# Patient Record
Sex: MALE | NOT HISPANIC OR LATINO | Employment: UNEMPLOYED | URBAN - METROPOLITAN AREA
[De-identification: names, ages, dates, MRNs, and addresses within clinical notes are randomized per-mention and may not be internally consistent; named-entity substitution may affect disease eponyms.]

---

## 2022-01-13 ENCOUNTER — TELEPHONE (OUTPATIENT)
Dept: BEHAVIORAL HEALTH | Facility: CLINIC | Age: 27
End: 2022-01-13

## 2022-01-13 ENCOUNTER — HOSPITAL ENCOUNTER (OUTPATIENT)
Facility: CLINIC | Age: 27
Setting detail: OBSERVATION
Discharge: PSYCHIATRIC OR SUBSTANCE USE FACILITY | End: 2022-01-14
Attending: EMERGENCY MEDICINE | Admitting: PSYCHIATRY & NEUROLOGY

## 2022-01-13 DIAGNOSIS — Z91.148 NONCOMPLIANCE WITH MEDICATION REGIMEN: ICD-10-CM

## 2022-01-13 DIAGNOSIS — Z86.59 HISTORY OF PSYCHIATRIC DISORDER: ICD-10-CM

## 2022-01-13 DIAGNOSIS — T65.92XA INGESTION OF SUBSTANCE, INTENTIONAL SELF-HARM, INITIAL ENCOUNTER (H): ICD-10-CM

## 2022-01-13 DIAGNOSIS — R45.851 SUICIDAL IDEATION: ICD-10-CM

## 2022-01-13 LAB
AMPHETAMINES UR QL SCN: NORMAL
ANION GAP SERPL CALCULATED.3IONS-SCNC: 8 MMOL/L (ref 3–14)
APAP SERPL-MCNC: <2 MG/L (ref 10–30)
ATRIAL RATE - MUSE: 92 BPM
BARBITURATES UR QL: NORMAL
BENZODIAZ UR QL: NORMAL
BUN SERPL-MCNC: 6 MG/DL (ref 7–30)
CALCIUM SERPL-MCNC: 8.5 MG/DL (ref 8.5–10.1)
CANNABINOIDS UR QL SCN: NORMAL
CHLORIDE BLD-SCNC: 112 MMOL/L (ref 94–109)
CO2 SERPL-SCNC: 22 MMOL/L (ref 20–32)
COCAINE UR QL: NORMAL
CREAT SERPL-MCNC: 0.53 MG/DL (ref 0.66–1.25)
DIASTOLIC BLOOD PRESSURE - MUSE: NORMAL MMHG
ETHANOL SERPL-MCNC: 0.27 G/DL
GFR SERPL CREATININE-BSD FRML MDRD: >90 ML/MIN/1.73M2
GLUCOSE BLD-MCNC: 120 MG/DL (ref 70–99)
INTERPRETATION ECG - MUSE: NORMAL
OPIATES UR QL SCN: NORMAL
P AXIS - MUSE: 17 DEGREES
PCP UR QL SCN: NORMAL
POTASSIUM BLD-SCNC: 3.3 MMOL/L (ref 3.4–5.3)
PR INTERVAL - MUSE: 152 MS
QRS DURATION - MUSE: 102 MS
QT - MUSE: 370 MS
QTC - MUSE: 457 MS
R AXIS - MUSE: 77 DEGREES
SALICYLATES SERPL-MCNC: <2 MG/DL
SARS-COV-2 RNA RESP QL NAA+PROBE: NEGATIVE
SODIUM SERPL-SCNC: 142 MMOL/L (ref 133–144)
SYSTOLIC BLOOD PRESSURE - MUSE: NORMAL MMHG
T AXIS - MUSE: 36 DEGREES
VENTRICULAR RATE- MUSE: 92 BPM

## 2022-01-13 PROCEDURE — 250N000011 HC RX IP 250 OP 636: Performed by: PSYCHIATRY & NEUROLOGY

## 2022-01-13 PROCEDURE — 80307 DRUG TEST PRSMV CHEM ANLYZR: CPT | Performed by: EMERGENCY MEDICINE

## 2022-01-13 PROCEDURE — 87635 SARS-COV-2 COVID-19 AMP PRB: CPT | Performed by: EMERGENCY MEDICINE

## 2022-01-13 PROCEDURE — 36415 COLL VENOUS BLD VENIPUNCTURE: CPT | Performed by: EMERGENCY MEDICINE

## 2022-01-13 PROCEDURE — 80143 DRUG ASSAY ACETAMINOPHEN: CPT | Performed by: EMERGENCY MEDICINE

## 2022-01-13 PROCEDURE — 93005 ELECTROCARDIOGRAM TRACING: CPT

## 2022-01-13 PROCEDURE — 90791 PSYCH DIAGNOSTIC EVALUATION: CPT

## 2022-01-13 PROCEDURE — 250N000013 HC RX MED GY IP 250 OP 250 PS 637: Performed by: PSYCHIATRY & NEUROLOGY

## 2022-01-13 PROCEDURE — 80179 DRUG ASSAY SALICYLATE: CPT | Performed by: EMERGENCY MEDICINE

## 2022-01-13 PROCEDURE — G0378 HOSPITAL OBSERVATION PER HR: HCPCS

## 2022-01-13 PROCEDURE — 82310 ASSAY OF CALCIUM: CPT | Performed by: EMERGENCY MEDICINE

## 2022-01-13 PROCEDURE — 250N000013 HC RX MED GY IP 250 OP 250 PS 637: Performed by: EMERGENCY MEDICINE

## 2022-01-13 PROCEDURE — 82077 ASSAY SPEC XCP UR&BREATH IA: CPT | Performed by: EMERGENCY MEDICINE

## 2022-01-13 PROCEDURE — 99285 EMERGENCY DEPT VISIT HI MDM: CPT

## 2022-01-13 PROCEDURE — C9803 HOPD COVID-19 SPEC COLLECT: HCPCS

## 2022-01-13 RX ORDER — DIVALPROEX SODIUM 500 MG/1
500 TABLET, EXTENDED RELEASE ORAL AT BEDTIME
Status: ON HOLD | COMMUNITY
Start: 2021-12-31 | End: 2022-01-21

## 2022-01-13 RX ORDER — DIAZEPAM 10 MG/2ML
5-10 INJECTION, SOLUTION INTRAMUSCULAR; INTRAVENOUS EVERY 30 MIN PRN
Status: DISCONTINUED | OUTPATIENT
Start: 2022-01-13 | End: 2022-01-14 | Stop reason: HOSPADM

## 2022-01-13 RX ORDER — QUETIAPINE FUMARATE 50 MG/1
150 TABLET, FILM COATED ORAL AT BEDTIME
Status: ON HOLD | COMMUNITY
Start: 2021-12-31 | End: 2022-01-21

## 2022-01-13 RX ORDER — QUETIAPINE FUMARATE 100 MG/1
100 TABLET, FILM COATED ORAL AT BEDTIME
Status: DISCONTINUED | OUTPATIENT
Start: 2022-01-13 | End: 2022-01-14 | Stop reason: HOSPADM

## 2022-01-13 RX ORDER — DOCUSATE SODIUM 100 MG/1
100 CAPSULE, LIQUID FILLED ORAL DAILY PRN
Status: ON HOLD | COMMUNITY
End: 2022-01-21

## 2022-01-13 RX ORDER — ACETAMINOPHEN 325 MG/1
650 TABLET ORAL ONCE
Status: COMPLETED | OUTPATIENT
Start: 2022-01-13 | End: 2022-01-13

## 2022-01-13 RX ORDER — ONDANSETRON 4 MG/1
4 TABLET, ORALLY DISINTEGRATING ORAL EVERY 6 HOURS PRN
Status: DISCONTINUED | OUTPATIENT
Start: 2022-01-13 | End: 2022-01-14 | Stop reason: HOSPADM

## 2022-01-13 RX ORDER — TRAZODONE HYDROCHLORIDE 100 MG/1
100 TABLET ORAL
Status: ON HOLD | COMMUNITY
Start: 2021-12-31 | End: 2022-01-21

## 2022-01-13 RX ORDER — POTASSIUM CHLORIDE 1.5 G/1.58G
40 POWDER, FOR SOLUTION ORAL ONCE
Status: COMPLETED | OUTPATIENT
Start: 2022-01-13 | End: 2022-01-13

## 2022-01-13 RX ORDER — HYDROXYZINE HYDROCHLORIDE 50 MG/1
50 TABLET, FILM COATED ORAL EVERY 6 HOURS PRN
Status: ON HOLD | COMMUNITY
Start: 2021-12-31 | End: 2022-01-21

## 2022-01-13 RX ORDER — DIAZEPAM 5 MG
10 TABLET ORAL EVERY 30 MIN PRN
Status: DISCONTINUED | OUTPATIENT
Start: 2022-01-13 | End: 2022-01-14 | Stop reason: HOSPADM

## 2022-01-13 RX ORDER — OMEPRAZOLE 40 MG/1
40 CAPSULE, DELAYED RELEASE ORAL DAILY
Status: ON HOLD | COMMUNITY
End: 2022-01-21

## 2022-01-13 RX ADMIN — ACETAMINOPHEN 650 MG: 325 TABLET, FILM COATED ORAL at 20:40

## 2022-01-13 RX ADMIN — QUETIAPINE FUMARATE 100 MG: 100 TABLET ORAL at 22:24

## 2022-01-13 RX ADMIN — ONDANSETRON 4 MG: 4 TABLET, ORALLY DISINTEGRATING ORAL at 22:24

## 2022-01-13 RX ADMIN — DIAZEPAM 10 MG: 5 TABLET ORAL at 22:49

## 2022-01-13 RX ADMIN — POTASSIUM CHLORIDE 40 MEQ: 1.5 POWDER, FOR SOLUTION ORAL at 20:39

## 2022-01-13 ASSESSMENT — MIFFLIN-ST. JEOR
SCORE: 1635.83
SCORE: 1653.97

## 2022-01-13 ASSESSMENT — ACTIVITIES OF DAILY LIVING (ADL): DRESS: SCRUBS (BEHAVIORAL HEALTH)

## 2022-01-13 NOTE — ED NOTES
Bed: [facility]  Expected date:   Expected time:   Means of arrival:   Comments:  533 24m etoh ETA [id number]

## 2022-01-13 NOTE — ED PROVIDER NOTES
History     Chief Complaint:  Alcohol Intoxication and Psych eval     The history is provided by the patient, the EMS personnel and a parent. History limited by: psychiatric condition.   History is supplemented by electronic medical records and phone call to mom.    Willy Flores is a 26 year old male with history of schizophrenia who presents by EMS with alcohol intoxication and psych eval. He reports that he was drinking vodka today and took 4-5 x 5 mg melatonin tablets at approximately 0800 this morning to try to sleep. He denies taking these with suicidal intention. EMS was called to his hotel in [place], where he is staying with his mother and brother, and he was found laying in the hotel bed. Blood glucose was 117. He is reportedly visiting family in [place], and he is originally from [place]. On speaking with his family, EMS was informed that Willy told his brother that he wanted to run into traffic earlier today to kill himself. When asked about his medical history here in the ED, Willy reports that he was hospitalized approximately 2 weeks ago in [place], [place]. He states that he was hospitalized because he tried to kill someone because he was angry. Today, he noted a history of schizophrenia to EMS. He states that it has been about 7 days since he has taken his medications. No recent fever, cough.    Review of Systems   Unable to perform ROS: Psychiatric disorder     Allergies:  The patient has no known allergies.     Medications:  Seroquel  Hydroxyzine  Trazodone  Depakote  Omeprazole   Colace    Past Medical History:     ADHD  Anxiety  Drug abuse  Crohn's colitis  Anorexia  Narcolepsy  Schizophrenia     Surgical History:  Circumcision    Social History:  The patient presents to the ED alone.  The patient presents to the ED via EMS.    Physical Exam     Patient Vitals for the past 24 hrs:   BP Temp Temp src Pulse Resp SpO2 Height Weight  "  01/13/22 2100 (!) 138/90 97.8  F (36.6  C) Oral 79 18 99 % 1.676 m (5' 6\") 70.3 kg (155 lb)   01/13/22 1757 -- 97.3  F (36.3  C) Oral 82 18 98 % 1.676 m (5' 6\") 72.1 kg (159 lb)   01/13/22 1756 (!) 138/93 -- -- 87 -- -- -- --     Physical Exam  General: Nontoxic-appearing male sitting upright in  to  HENT: mucous membranes moist  Eyes: PERRL without nystagmus  CV: extremities well perfused, regular rhythm  Resp: normal effort, speaks in full phrases, no stridor, no cough observed  GI: abdomen soft and nontender, no guarding  MSK: no bony tenderness   Skin: appropriately warm and dry  Neuro: alert, slightly slurred speech, oriented though poor historian, normal tone in extremities, ambulatory independently  Psych: cooperative with basic cares, reports recently feeling suicidal, no evidence of hallucinations, laughs inappropriately    Emergency Department Course     ECG  ECG taken at 1937, ECG read at 1958  Sinus rhythm  Rate 92 bpm. KY interval 152 ms. QRS duration 102 ms. QT/QTc 370/457 ms. P-R-T axes 17 77 36.      Laboratory:  Labs Ordered and Resulted from Time of ED Arrival to Time of ED Departure   BASIC METABOLIC PANEL - Abnormal       Result Value    Sodium 142      Potassium 3.3 (*)     Chloride 112 (*)     Carbon Dioxide (CO2) 22      Anion Gap 8      Urea Nitrogen 6 (*)     Creatinine 0.53 (*)     Calcium 8.5      Glucose 120 (*)     GFR Estimate >90     ETHYL ALCOHOL LEVEL - Abnormal    Alcohol ethyl 0.27 (*)    ACETAMINOPHEN LEVEL - Abnormal    Acetaminophen <2 (*)    SALICYLATE LEVEL - Normal    Salicylate <2     COVID-19 VIRUS (CORONAVIRUS) BY PCR - Normal    SARS CoV2 PCR Negative     DRUG ABUSE SCREEN 77 URINE (FL, RH, SH) - Normal    Amphetamines Urine Screen Negative      Barbiturates Urine Screen Negative      Benzodiazepines Urine Screen Negative      Cannabinoids Urine Screen Negative      Cocaine Urine Screen Negative      Opiates Urine Screen Negative      PCP Urine Screen Negative      "   Emergency Department Course:    Reviewed:  I reviewed nursing notes, vitals, past medical history, Care Everywhere    Assessments/Consults:  1741 I obtained history and examined the patient as noted above.   1750 I called the patient's mother and obtained additional history. She reports that he has been in and out of hospitals, drinks alcohol, does not take his medications, and has suicidal and homicidal ideation.  2120 I rechecked the patient and explained findings.   2019 I updated the patient's nurse.    Interventions:  2039 Klor-Con 40 mEq PO  2040 Tylenol 650 mg PO    Disposition:  The patient was transferred to [facility].     Impression & Plan     Medical Decision Making:  He presents with suicidal ideation DEC and in the setting of prior recent psychiatric hospitalization and polysubstance abuse.  Alcohol level fairly elevated so he was monitored for period of hours here in the emergency department.  I spoke with his mother who provided additional history.  The patient was placed on a hold as he represents a danger to himself in light of his recent actions and statements.  He is laughing inappropriately and demonstrating other evidence of probable psychosis.  Medication noncompliance has been an issue in the past and likely playing some role again today.  Medical and traumatic precipitants were considered but are not identified or suspected, and he was ultimately medically cleared and transferred to [facility] for more detailed mental health assessment.    Diagnosis:    ICD-10-CM    1. Ingestion of substance, intentional self-harm, initial encounter (H)  T65.92XA    2. Suicidal ideation  R45.851    3. History of psychiatric disorder  Z86.59    4. Noncompliance with medication regimen  Z91.14      Scribe Disclosure:  I, Rosita Mahoney, am serving as a scribe at 5:52 PM on 1/13/2022 to document services personally performed by Ganesh Craig MD based on my observations and the provider's statements to me.      This note was completed in part using Dragon voice recognition software. Although reviewed after completion, some word and grammatical errors may occur.        Ganesh Craig MD  01/13/22 1035

## 2022-01-14 ENCOUNTER — HOSPITAL ENCOUNTER (INPATIENT)
Facility: CLINIC | Age: 27
LOS: 7 days | Discharge: HOME | DRG: 885 | End: 2022-01-21
Attending: PSYCHIATRY & NEUROLOGY | Admitting: PSYCHIATRY & NEUROLOGY

## 2022-01-14 ENCOUNTER — TELEPHONE (OUTPATIENT)
Dept: BEHAVIORAL HEALTH | Facility: CLINIC | Age: 27
End: 2022-01-14

## 2022-01-14 VITALS
HEIGHT: 66 IN | HEART RATE: 102 BPM | WEIGHT: 155 LBS | RESPIRATION RATE: 20 BRPM | TEMPERATURE: 97.8 F | DIASTOLIC BLOOD PRESSURE: 96 MMHG | SYSTOLIC BLOOD PRESSURE: 140 MMHG | OXYGEN SATURATION: 99 % | BODY MASS INDEX: 24.91 KG/M2

## 2022-01-14 DIAGNOSIS — F90.0 ADHD (ATTENTION DEFICIT HYPERACTIVITY DISORDER), INATTENTIVE TYPE: ICD-10-CM

## 2022-01-14 DIAGNOSIS — F51.05 INSOMNIA DUE TO OTHER MENTAL DISORDER: ICD-10-CM

## 2022-01-14 DIAGNOSIS — F10.20 ALCOHOL USE DISORDER, SEVERE, DEPENDENCE (H): ICD-10-CM

## 2022-01-14 DIAGNOSIS — K59.09 OTHER CONSTIPATION: Primary | ICD-10-CM

## 2022-01-14 DIAGNOSIS — F25.9 SCHIZOPHRENIA, SCHIZOAFFECTIVE, CHRONIC WITH ACUTE EXACERBATION (H): ICD-10-CM

## 2022-01-14 DIAGNOSIS — F43.10 PTSD (POST-TRAUMATIC STRESS DISORDER): ICD-10-CM

## 2022-01-14 DIAGNOSIS — R12 HEARTBURN: ICD-10-CM

## 2022-01-14 DIAGNOSIS — F41.9 ANXIETY: ICD-10-CM

## 2022-01-14 DIAGNOSIS — F99 INSOMNIA DUE TO OTHER MENTAL DISORDER: ICD-10-CM

## 2022-01-14 DIAGNOSIS — R52 PAIN: ICD-10-CM

## 2022-01-14 PROBLEM — F39 MOOD DISORDER (H): Status: ACTIVE | Noted: 2022-01-14

## 2022-01-14 LAB
ALBUMIN SERPL-MCNC: 4.4 G/DL (ref 3.5–5)
ANION GAP SERPL CALCULATED.3IONS-SCNC: 11 MMOL/L (ref 5–18)
BUN SERPL-MCNC: 6 MG/DL (ref 8–22)
CALCIUM SERPL-MCNC: 9.5 MG/DL (ref 8.5–10.5)
CHLORIDE BLD-SCNC: 103 MMOL/L (ref 98–107)
CO2 SERPL-SCNC: 23 MMOL/L (ref 22–31)
CREAT SERPL-MCNC: 0.75 MG/DL (ref 0.7–1.3)
GFR SERPL CREATININE-BSD FRML MDRD: >90 ML/MIN/1.73M2
GLUCOSE BLD-MCNC: 99 MG/DL (ref 70–125)
PHOSPHATE SERPL-MCNC: 3 MG/DL (ref 2.5–4.5)
POTASSIUM BLD-SCNC: 3.6 MMOL/L (ref 3.5–5)
SODIUM SERPL-SCNC: 137 MMOL/L (ref 136–145)

## 2022-01-14 PROCEDURE — 36415 COLL VENOUS BLD VENIPUNCTURE: CPT | Performed by: PSYCHIATRY & NEUROLOGY

## 2022-01-14 PROCEDURE — 80069 RENAL FUNCTION PANEL: CPT | Performed by: PSYCHIATRY & NEUROLOGY

## 2022-01-14 PROCEDURE — HZ2ZZZZ DETOXIFICATION SERVICES FOR SUBSTANCE ABUSE TREATMENT: ICD-10-PCS | Performed by: PSYCHIATRY & NEUROLOGY

## 2022-01-14 PROCEDURE — 250N000013 HC RX MED GY IP 250 OP 250 PS 637: Performed by: PSYCHIATRY & NEUROLOGY

## 2022-01-14 PROCEDURE — 99223 1ST HOSP IP/OBS HIGH 75: CPT | Performed by: PSYCHIATRY & NEUROLOGY

## 2022-01-14 PROCEDURE — G0378 HOSPITAL OBSERVATION PER HR: HCPCS

## 2022-01-14 PROCEDURE — 128N000001 HC R&B CD/MH ADULT

## 2022-01-14 RX ORDER — HYDROXYZINE HYDROCHLORIDE 25 MG/1
25 TABLET, FILM COATED ORAL EVERY 4 HOURS PRN
Status: DISCONTINUED | OUTPATIENT
Start: 2022-01-14 | End: 2022-01-14

## 2022-01-14 RX ORDER — GABAPENTIN 300 MG/1
300 CAPSULE ORAL 3 TIMES DAILY
Status: DISCONTINUED | OUTPATIENT
Start: 2022-01-14 | End: 2022-01-21 | Stop reason: HOSPADM

## 2022-01-14 RX ORDER — DIVALPROEX SODIUM 250 MG/1
500 TABLET, EXTENDED RELEASE ORAL AT BEDTIME
Status: DISCONTINUED | OUTPATIENT
Start: 2022-01-14 | End: 2022-01-18

## 2022-01-14 RX ORDER — DIAZEPAM 5 MG
10 TABLET ORAL
Status: DISCONTINUED | OUTPATIENT
Start: 2022-01-14 | End: 2022-01-19

## 2022-01-14 RX ORDER — ONDANSETRON 4 MG/1
4 TABLET, ORALLY DISINTEGRATING ORAL EVERY 6 HOURS PRN
Status: DISCONTINUED | OUTPATIENT
Start: 2022-01-14 | End: 2022-01-21 | Stop reason: HOSPADM

## 2022-01-14 RX ORDER — TRAZODONE HYDROCHLORIDE 100 MG/1
100 TABLET ORAL
Status: DISCONTINUED | OUTPATIENT
Start: 2022-01-14 | End: 2022-01-21 | Stop reason: HOSPADM

## 2022-01-14 RX ORDER — LANOLIN ALCOHOL/MO/W.PET/CERES
3 CREAM (GRAM) TOPICAL
Status: DISCONTINUED | OUTPATIENT
Start: 2022-01-14 | End: 2022-01-21 | Stop reason: HOSPADM

## 2022-01-14 RX ORDER — QUETIAPINE FUMARATE 200 MG/1
200 TABLET, FILM COATED ORAL AT BEDTIME
Status: DISCONTINUED | OUTPATIENT
Start: 2022-01-14 | End: 2022-01-17

## 2022-01-14 RX ORDER — HYDROXYZINE HYDROCHLORIDE 25 MG/1
50 TABLET, FILM COATED ORAL EVERY 6 HOURS PRN
Status: DISCONTINUED | OUTPATIENT
Start: 2022-01-14 | End: 2022-01-21 | Stop reason: HOSPADM

## 2022-01-14 RX ORDER — FOLIC ACID 1 MG/1
1 TABLET ORAL DAILY
Status: DISCONTINUED | OUTPATIENT
Start: 2022-01-14 | End: 2022-01-21 | Stop reason: HOSPADM

## 2022-01-14 RX ORDER — MULTIVITAMIN,THERAPEUTIC
1 TABLET ORAL DAILY
Status: DISCONTINUED | OUTPATIENT
Start: 2022-01-14 | End: 2022-01-21 | Stop reason: HOSPADM

## 2022-01-14 RX ORDER — DIAZEPAM 5 MG
10 TABLET ORAL 3 TIMES DAILY
Status: COMPLETED | OUTPATIENT
Start: 2022-01-14 | End: 2022-01-15

## 2022-01-14 RX ORDER — DOCUSATE SODIUM 100 MG/1
100 CAPSULE, LIQUID FILLED ORAL DAILY PRN
Status: DISCONTINUED | OUTPATIENT
Start: 2022-01-14 | End: 2022-01-21 | Stop reason: HOSPADM

## 2022-01-14 RX ORDER — GABAPENTIN 300 MG/1
300 CAPSULE ORAL EVERY 6 HOURS PRN
Status: DISCONTINUED | OUTPATIENT
Start: 2022-01-14 | End: 2022-01-19

## 2022-01-14 RX ORDER — MAGNESIUM HYDROXIDE/ALUMINUM HYDROXICE/SIMETHICONE 120; 1200; 1200 MG/30ML; MG/30ML; MG/30ML
30 SUSPENSION ORAL EVERY 4 HOURS PRN
Status: DISCONTINUED | OUTPATIENT
Start: 2022-01-14 | End: 2022-01-21 | Stop reason: HOSPADM

## 2022-01-14 RX ADMIN — QUETIAPINE FUMARATE 200 MG: 200 TABLET ORAL at 20:19

## 2022-01-14 RX ADMIN — OMEPRAZOLE 40 MG: 20 CAPSULE, DELAYED RELEASE ORAL at 11:34

## 2022-01-14 RX ADMIN — MULTIPLE VITAMINS W/ MINERALS TAB 1 TABLET: TAB at 20:20

## 2022-01-14 RX ADMIN — Medication 100 MG: at 20:18

## 2022-01-14 RX ADMIN — FOLIC ACID 1 MG: 1 TABLET ORAL at 20:18

## 2022-01-14 RX ADMIN — MELATONIN 5 MG TABLET 5 MG: at 03:06

## 2022-01-14 RX ADMIN — GABAPENTIN 300 MG: 300 CAPSULE ORAL at 20:18

## 2022-01-14 RX ADMIN — DIVALPROEX SODIUM 500 MG: 250 TABLET, FILM COATED, EXTENDED RELEASE ORAL at 20:19

## 2022-01-14 RX ADMIN — DIAZEPAM 10 MG: 5 TABLET ORAL at 20:18

## 2022-01-14 ASSESSMENT — ACTIVITIES OF DAILY LIVING (ADL)
DOING_ERRANDS_INDEPENDENTLY_DIFFICULTY: NO
WEAR_GLASSES_OR_BLIND: NO
LAUNDRY: WITH SUPERVISION
LAUNDRY: WITH SUPERVISION
DRESS: INDEPENDENT
WALKING_OR_CLIMBING_STAIRS_DIFFICULTY: NO
PATIENT_/_FAMILY_COMMUNICATION_STYLE: SPOKEN LANGUAGE (ENGLISH OR BILINGUAL)
HYGIENE/GROOMING: INDEPENDENT
FALL_HISTORY_WITHIN_LAST_SIX_MONTHS: NO
ORAL_HYGIENE: INDEPENDENT
DRESSING/BATHING_DIFFICULTY: NO
TOILETING_ISSUES: NO
DIFFICULTY_COMMUNICATING: NO
CONCENTRATING,_REMEMBERING_OR_MAKING_DECISIONS_DIFFICULTY: NO
HYGIENE/GROOMING: INDEPENDENT
ADL_ASSESSMENT: WDL
DIFFICULTY_EATING/SWALLOWING: NO
DRESS: SCRUBS (BEHAVIORAL HEALTH)
ORAL_HYGIENE: INDEPENDENT
HEARING_DIFFICULTY_OR_DEAF: NO

## 2022-01-14 NOTE — TREATMENT PLAN
[facility] Treatment Plan    Client's Name: Willy Flores  YOB: 1995    DSM-5 Diagnoses: 295.90  (F20.9) Schizophrenia - by history   300.02 (F41.1) Generalized Anxiety Disorder - by history     Psychosocial / Contextual Factors: Patient presented to the [facility]PATH unit with the following concerns: Suicidal attempt and SI    Anticipated number of sessions or this episode of care: 1-4    MeasurableTreatment Goal(s) related to diagnosis / functional impairment(s)    Goal 1: Patient will stabilize the suicidal crisis    Objective #A     Patient will express feelings related to SI in order to explore factors/triggers  Status: New as of January 14, 2022    Intervention(s)  LMHP will assist patient in becoming aware of life factors and triggers that may have been  precursors to SI    Objective #B  Patient will participate in therapy session around emotional issues resulting in suicidal thoughts  Status: New as of January 14, 2022    Intervention(s)  LMHP will encourage pt to express feelings and emotions      Goal 2: Patient will gain insight into the importance of medication compliance     Objective #A     Patient will discuss barriers to medication compliance   Status: New as of January 14, 2022    Intervention(s)  LMHP will facilitate discussion     Objective #B  Patient will create a plan to regularly take medications as prescribed   Status: New as of January 14, 2022    Intervention(s)  LMHP will assist in the development of plan and provide psychoeducation on the importance of medication management            Appearance:   Disheveled    Eye Contact:   Fair    Psychomotor Behavior: Restless    Attitude:   Cooperative    Orientation:   Person   Speech    Rate / Production: Pressured     Volume:  Loud    Mood:    Anxious  Irritable    Affect:    Labile    Thought Content:  Hallucinations  Suicidal   Thought Form:  Flight of Ideas  Illogical   Insight:    Poor           PLAN:   Patient will board in [facility]  until patient and treatment deem it appropriate to either discharge or admit to a higher level of care. Details: writer recommends inpatient mental health and patient reports he would not be willing to go. Should be reassessed in morning to see if a hold would be appropriate.       Maria Teresa Kerns                                                           ________

## 2022-01-14 NOTE — ED PROVIDER NOTES
Pt evaluated by DEC in [facility] and felt pt requires inpt admission.  Pt placed on 72 hr hold for psychiatric admission.     Willy Rehman MD  01/14/22 0502

## 2022-01-14 NOTE — PROGRESS NOTES
"   01/14/22 1434   Engagement   Intervention Group   Topic Detail OT: Creative expression group (Fimo beading day 2) for planning, sequencing, concentration and leisure participation for management of mental health symptoms.   Attendance Attended   Patient Response Needs reinforcement/repetition to learn materials   Concentrated on Task 30 - 45 min   Cognition Preoccupied;Restless;Follows through with task   Mood/Affect Flat;Anxious   Social/Behavioral Cooperative   Thought Content Disorganized   Goals addressed in session today Pt sharing he has, \"not slept in 36 days,\" and requesting to discharge if he can find a place to go. Pt's speech and thought content disorganized stating, \"I just came from [place]\" after previously stating he had come from [place]. Pt completing project in disorganized manner, randomly stringing beads. Pt leaving gp briefly to pace in hallway then returning independently.     "

## 2022-01-14 NOTE — TELEPHONE ENCOUNTER
R: 4500 / [facility]  0021 - No appropriate beds at [facility] at this time. Called [facility] re: 4500 ability to take ED admissions. Dennise will call intake back. 0306 - Discussed w/ Dennise, ANS. 4500 has one pt transfering in the AM so can only take 1 ED pt tonight but they are staffing up for day shift to be able to take up to full capacity. 0316 - Called [facility]. Awaiting callback from provider. 0337 - On call, [facility] accepts for MH admission. 0342 - Called [facility] re: 72 emergency hold. 0359 - Placed pt in queue and notified unit. RN to review chart and call intake back. Informed RN that intake awaiting 72 emergency medical hold in chart before giving disposition to ED. 0437 -  Unit RN called back. Report can be given once 72 emergency hold is in chart. 0520 - Hold paperwork in chart. Notified ED of placement.

## 2022-01-14 NOTE — CONSULTS
Name: Willy Flores  : 1995  Date: 2022  Location of patient: [facility]   Location of doctor: [place]    Spoke with: psych liaison     HPI: 26 year old male with history of schizophrenia and suicidal ideations and intent and plan.   Covid negative and medically cleared.   BAL was 270. UDS negative.       Plan: Accept on 72 hour hold basis to mental health unit. [facility] alcohol withdrawal order set.               Stephon Gallagher M.D.  Psychiatry

## 2022-01-14 NOTE — PLAN OF CARE
Problem: Behavioral Health Plan of Care  Goal: Absence of New-Onset Illness or Injury  Outcome: No Change  Intervention: Identify and Manage Fall Risk  Recent Flowsheet Documentation  Taken 1/14/2022 1002 by Lucia Robles RN  Safety Measures: environmental rounds completed  Goal: Optimized Coping Skills in Response to Life Stressors  Outcome: No Change     Problem: Suicidal Behavior  Goal: Suicidal Behavior is Absent or Managed  Outcome: No Change  Flowsheets (Taken 1/14/2022 1211)  Mutually Determined Action Steps (Suicidal Behavior Absent/Managed): sets future-oriented goal  Individualized Action Step (Suicidal Behavior Absent/Managed): i will live with my mother and get a job.   Calm and polite manner. New medication explained. Being with strangers causes me to have anxiety. Walked with  me around unit and he feels better. Denies SI/HI/SIB/AH/VH. Denies depression. Cooperative with admission. Using phone to call brother , aunt, mother.

## 2022-01-14 NOTE — ED TRIAGE NOTES
"Pt mom called EMS from a hotel stating that he had told his family he \"felt worthless and wanted to run in to traffic.\"  "

## 2022-01-14 NOTE — PROGRESS NOTES
01/14/22 1024   Engagement   Intervention Group   Topic Detail OT: Wellness (Symone Ante) for socialization, healthy leisure, frustration tolerance and sequencing for management of mental health symptoms   Attendance Did not attend

## 2022-01-14 NOTE — PLAN OF CARE
"    Initial Psychosocial Assessment    Type of CM visit: Initial Assessment, Clinical Treatment Coordinator Role Introduction, Offer Discharge Planning    Information obtained from: [x]Patient   [x]Chart review  [x]Collateral Contacts  []Court Website    Hospitalization information:   Willy Flores is a 26 year old who was admitted to unit 4500 on 1/14/2022 due to alcohol intoxication and suicidal ideations.    Patient Self-Assessment  Patient reported reason for admission: \"My mom called the .\"     Patient reported symptoms of concern: []sadness    []anxiety     []anger    [x]poor sleep     []medications not working    []racing thoughts     [x]substance use     []agitation     []hearing voices     []hopelessness   []Eating concerns    []Self-injury      [] Other   Comments:    Current suicidal ideation:  [x]No    []Yes, no plan     []Yes, with plan (describe):          Comments:   Current homicidal ideation:  [x]No   [] Yes       Comments:     Legal Status at Admission: 72 Hour Hold  72 Hour Hold - Date/Time Initiated: 1/14/22 @0516  72 Hour Hold - Date/Time Ends: 1/19/22 @0000  Power of  (name):  (none)  Rep Payee (name):  (none)      History of Mental Health:  Describe current and past mental health symptoms present? Patient reports mental health diagnoses of schizophrenia, anxiety, and insomnia. He denies any current mental health symptoms and reports he is here for \"no reason.\" He reports \"sometimes I say stupid things and then I end up in the hospital.\" Patient estimates being in the hospital 14 times previously. Most recently in [place] where he was visiting temporarily with his family. Patient denies any current SI, HI, or psychotic features. He reports seeing a doctor in [place] for medications, but has not seen him in 6 months and has stopped taking all his medications.        Do you understand your mental health diagnosis? YES [x]   NO []    History of psychiatric hospitalizations?  YES " [x]     NO  []  Details: He estimates 14 previous hospitalizations. Most recently, two weeks ago in [place]. If YES, within the last 30 days? YES [x]     NO  []    History of commitment?  YES []     NO  [x]    Details:   History of ECT?  YES []     NO  [x]    Details:     History of Substance Use Disorder:  Have you used alcohol or substances in the past 12 months? YES [x]/ NO []              If Yes, Type tequila Frequency Patient reports drinking daily. He was unable to identify how much.    Would you like a substance use disorder evaluation? YES [] / NO [x]    Previous Treatment? YES [x]/ NO []  Details: He reports going to treatment for alcohol in July 2021 in [place].    Significant Life Events  (Illness, Death, Loss): none reported      Is there a history of abuse or psychological trauma:    [x]Denies       []Yes, present (type):         []Yes, past (type):        []Patient declined to answer    Identify current stressors:    []financial,    []legal issues,    []homelessness,    []housing,     []recent loss,    []relationships,    [x]substance use concerns,    []medical     []unemployment     []employment  concerns    []isolation,    [x]lack of resources,     []out of home placements,     []parenting issues     []domestic violence     []other:  Comments:       Living Situation: Patient lives with his mother and father in [place], [place].     [x]House/apt    []Group Home    []IRTS     []Homeless     []Assisted Living     []Nursing home    []Lives alone    []Lives with :                         []Other:          Family Composition: mother, father    Children, ages and current location if minor: no children     Relationship status  [x]Single     []     []     []       []Significant Other   []Other:     Educational Background:  [x]Less Than High School     []High School     []GED     []College       Cognitive/learning concerns: none reported    Financial Status: [] Employed, status and  "location:  []Unemployment    []County Assistance     []SSI/SSDI      []Waivered services    [x]Other: no income    Legal status(present):   []Voluntary, [x]72-hour hold, []Commitment, []Guardianship, []Revocation, []Stay of commitment,    Details:    Other legal issues identified:  []None, []Arrest,  []Probation/[place],  []Driving under influence,  []Incarceration,  []Sexual offense (level):   []Child Protective Services,      [x]Other:  Patient reports a legal issue in [place], but did not elaborate.    Details:    Ethnic/Cultural considerations:  None reported    Spiritual considerations: None reported     Service History:  [x]No     []Yes: details:    Social Functioning (organization, interests) and strengths: Patient reports he enjoys walking.    Current Treatment Providers Are:     NO Name, Agency, and phone   Psychiatrist  [] Patient does not know the name of his doctor in [place].   Psychotherapist  [x]    [facility] worker  [x]      [x]    Waivered Services  [x]    [facility]  [x]    Day Treatment/PHP/TONY trtmt  [x]    Group Home/AFC/AL  [x]      [x]    Other:  [x]            Social Service Assessment of identified patient needs and plan to meet those needs: Willy Flores is a 26 year old, single, male who was admitted to unit 4500 on 1/14/2022 due to alcohol intoxication and suicidal ideations. During this intake assessment patient was cooperative, but dismissive of questions and unable to elaborate on information. Patient reports mental health diagnoses of schizophrenia, anxiety, and insomnia. He denies any current mental health symptoms and reports he is here for \"no reason.\" He reports \"sometimes I say stupid things and then I end up in the hospital.\" Patient estimates being in the hospital 14 times previously. Most recently, two weeks ago in [place] where he was visiting temporarily with his family. Patient denies any current SI, HI, or psychotic features. He reports " seeing a doctor in [place] for medications, but has not seen him in 6 months and has stopped taking all his medications. Patient reports he drinks alcohol daily usually tequila. He reports going to treatment for alcohol in July 2021 in [place]. Patient reports dropping out of school after the 10th grade. He awknowledges a legal issue in [place], but did not elaborate.  Patient lives with his parents in [place], [place] and they were suppose to travel back there tomorrow. He reports they were temporarily visiting [place] and staying with friends there. Patient was willing to sign ROIs for his parents.      [facility] spoke with patient's mother, Bg [phone], utilizing the language line with a Lao interpretor. Bg was able to provide collateral information regarding patient's history. She reports he has struggled with anorexia, schizophrenia, depression, adhd, and substance use. Patient has frequent hospitalizations due to not taking his medications and substance use. She reports he has an appointed  in [place] who is assisting patient with getting residential treatment placement. Bg is in agreement with treatment plan of having patient stabilize at the hospital and then she will purchase him a flight back to [place] where they live. Bg will schedule a doctor's appointment there for patient to continue with medication. NIRMAL in chart.       Possible discharge plan: Home with outpatient supports        Barriers: Medication Management, Symptom Stabilization, Coordination of Care

## 2022-01-14 NOTE — PROGRESS NOTES
26 year patient admitted from [facility] to unit 4500 due to SI comment. Said he will run into traffic.  History of Schizophrenia. Patient was reported to be from [place]. Turkmen speaking but states she speaks and understands English and would not need . Reported alcohol intake with Vodka drink of choice. CIWA at ED per report at 4 and 2. Did not need any intervention  Pt is calm and cooperative but is short with answers. Down to gown done  and introduced to unit. Patient will need admission documentation completed. No sign of withdrawal noted. Vitals completed . Patient is on 72 hr hold. Reported off to incoming nurse.

## 2022-01-14 NOTE — SAFE
Willy Flores  January 13, 2022  SAFE Note    Critical Safety Issues: Currently suicidal, reports that he's attempted to get alcohol poisoning every day for the past two weeks as a suicide attempt and told family he was going to crash into oncoming traffic today       Current Suicidal Ideation/Self-Injurious Concerns/Methods: Ingestion alcohol every day for the past two weeks and Other crashing car into oncoming traffic      Current or Historical Inappropriate Sexual Behavior: Unknown      Current or Historical Patient reports that he often thinks about hurting others as he wants everyone else to suffer as much as he does. Denied any current HI to writer and states that he has HI to his nurse      Triggers: Suffering, voices in head     Updated care team: Yes: Updated [facility] staff, no attending provider currently but will be updated in the morning     For additional details see full LMHP assessment.       Maria Teresa Kerns

## 2022-01-14 NOTE — PROGRESS NOTES
Patient agrees to discharge plan. Discharge instructions reviewed with patient including follow-up care plan. Medications: reviewed. Reviewed safety plan and outpatient resources. Denies SI and HI. All belongings that were brought into the hospital have been returned to patient. Escorted off the unit at 0620 accompanied by [facility] staff. Discharged to [facility] 4500 via ambulance.

## 2022-01-14 NOTE — ED NOTES
"26 year old male with history of schizophrenia, SI, and HI received from ED due to alcohol intoxication and suicidal statements. Reports that he doesn't remember what happened today and wasn't sure why he is here or where he is. He was oriented to the story given to his mother and he started laughing saying \"No, I just wanted to race traffic, I'm not suicidal\" Pt appears disorganized and a bit elevated. Denies SI and endorses HI. Nursing and risk assessments completed. Assessments reviewed with LMHP and physician. Video monitoring in progress, patient informed.  Admission information reviewed with patient. Patient given a tour of [facility] and instructions on using the facility. Questions regarding [facility] addressed. Pt search completed and belongings inventoried.    "
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
## 2022-01-14 NOTE — PROGRESS NOTES
01/14/22 1400   Engagement   Intervention Group   Topic Detail   (Process Group: Positivity)   Attendance Attended   Patient Response No evidence of learning   Concentrated on Task 5 - 10 min   Cognition Restless   Mood/Affect Blunted   Social/Behavioral Disengaged   Thought Content Other (comment)     GROUP LENGTH (MINS):   30 mins.   RELEVANT PRIMARY DIAGNOSIS: Patient Active Problem List   Diagnosis     Suicidal ideation     Noncompliance with medication regimen     History of psychiatric disorder     Ingestion of substance, intentional self-harm, initial encounter (H)     Mood disorder (H)        TREATMENT MODALITY:      []CBT       []DBT       []ACT       []Interpersonal psychotherapy                                 []Psychoeducational therapy       []Skill development       []Other:        ATTENDANCE:        []Stayed for entire group     [x]Arrived late    [x] Left early       # OF PATIENTS IN GROUP: 5   BILLABLE:     []Yes            [x]No   Notes:
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
## 2022-01-15 PROCEDURE — 128N000001 HC R&B CD/MH ADULT

## 2022-01-15 PROCEDURE — 250N000013 HC RX MED GY IP 250 OP 250 PS 637: Performed by: PSYCHIATRY & NEUROLOGY

## 2022-01-15 RX ADMIN — DIAZEPAM 10 MG: 5 TABLET ORAL at 13:35

## 2022-01-15 RX ADMIN — DIVALPROEX SODIUM 500 MG: 250 TABLET, FILM COATED, EXTENDED RELEASE ORAL at 20:10

## 2022-01-15 RX ADMIN — GABAPENTIN 300 MG: 300 CAPSULE ORAL at 08:28

## 2022-01-15 RX ADMIN — QUETIAPINE FUMARATE 200 MG: 200 TABLET ORAL at 20:10

## 2022-01-15 RX ADMIN — FOLIC ACID 1 MG: 1 TABLET ORAL at 08:28

## 2022-01-15 RX ADMIN — Medication 100 MG: at 08:29

## 2022-01-15 RX ADMIN — OMEPRAZOLE 40 MG: 20 CAPSULE, DELAYED RELEASE ORAL at 08:29

## 2022-01-15 RX ADMIN — GABAPENTIN 300 MG: 300 CAPSULE ORAL at 13:36

## 2022-01-15 RX ADMIN — MULTIPLE VITAMINS W/ MINERALS TAB 1 TABLET: TAB at 08:29

## 2022-01-15 RX ADMIN — DIAZEPAM 10 MG: 5 TABLET ORAL at 08:27

## 2022-01-15 RX ADMIN — GABAPENTIN 300 MG: 300 CAPSULE ORAL at 20:10

## 2022-01-15 ASSESSMENT — ACTIVITIES OF DAILY LIVING (ADL)
HYGIENE/GROOMING: INDEPENDENT
DRESS: SCRUBS (BEHAVIORAL HEALTH)
ORAL_HYGIENE: INDEPENDENT
LAUNDRY: WITH SUPERVISION
HYGIENE/GROOMING: INDEPENDENT
ORAL_HYGIENE: INDEPENDENT
LAUNDRY: WITH SUPERVISION
DRESS: INDEPENDENT

## 2022-01-15 NOTE — PROGRESS NOTES
01/15/22 1037   Engagement   Intervention Group   Topic Detail Ori game for concentration, tracking, sequencing, coping, symptom management, healthy leisure, building self-esteem and an opportunity for socialization   Attendance Did not attend

## 2022-01-15 NOTE — PLAN OF CARE
Problem: Behavioral Health Plan of Care  Goal: Adheres to Safety Considerations for Self and Others  Outcome: Improving     Pt was calm and cooperative.  He denied pain and all psych symptoms.  He ate well and he was engaged with selected peers.  He was watching TV in the lounge for a part of this evening.  CIWA score was 0.  There was no S.I behavior observed during this shift.

## 2022-01-15 NOTE — PLAN OF CARE
"  Problem: Behavioral Health Plan of Care  Goal: Plan of Care Review  Outcome: No Change  Flowsheets (Taken 1/15/2022 1000)  Plan of Care Reviewed With: patient  Patient Agreement with Plan of Care: agrees  Goal: Patient-Specific Goal (Individualization)  Outcome: No Change  Flowsheets (Taken 1/15/2022 1051)  Patient Vulnerabilities: history of unsuccessful treatment  Patient Personal Strengths: expressive of needs  Note:     Goal Status:      Goal: Adheres to Safety Considerations for Self and Others  Outcome: No Change  Intervention: Develop and Maintain Individualized Safety Plan  Recent Flowsheet Documentation  Taken 1/15/2022 1000 by Lucia Robles, RN  Safety Measures: safety rounds completed  Goal: Absence of New-Onset Illness or Injury  Outcome: No Change  Intervention: Identify and Manage Fall Risk  Recent Flowsheet Documentation  Taken 1/15/2022 1000 by Lucia Robles, RN  Safety Measures: safety rounds completed     Problem: Suicidal Behavior  Goal: Suicidal Behavior is Absent or Managed  Outcome: No Change  Flowsheets (Taken 1/14/2022 1211)  Individualized Action Step (Suicidal Behavior Absent/Managed): i will live with my mother and get a job.     Problem: Sleep Disturbance  Goal: Adequate Sleep/Rest  Outcome: No Change   Pt said he is \"\"hungry and tired \" first thing this morning. Ate well and napped after meal. Calm and cooperative manner. Took medications. Denies SI/HI/SIB/AH/VH. Anxiety gone now. \"Everything is OK\" Contracts for safety. Isolative and withdrawn at times. CIWA score is 0.  "

## 2022-01-15 NOTE — PLAN OF CARE
Problem: Sleep Disturbance  Goal: Adequate Sleep/Rest  Outcome: Improving   Pt slept for 7 hrs this shift, no complain of pain, no significant event and no PRN given. Will continue to monitor.

## 2022-01-16 PROCEDURE — 250N000013 HC RX MED GY IP 250 OP 250 PS 637: Performed by: PSYCHIATRY & NEUROLOGY

## 2022-01-16 PROCEDURE — 128N000001 HC R&B CD/MH ADULT

## 2022-01-16 RX ADMIN — Medication 100 MG: at 08:52

## 2022-01-16 RX ADMIN — GABAPENTIN 300 MG: 300 CAPSULE ORAL at 14:12

## 2022-01-16 RX ADMIN — OMEPRAZOLE 40 MG: 20 CAPSULE, DELAYED RELEASE ORAL at 08:52

## 2022-01-16 RX ADMIN — GABAPENTIN 300 MG: 300 CAPSULE ORAL at 08:51

## 2022-01-16 RX ADMIN — FOLIC ACID 1 MG: 1 TABLET ORAL at 08:50

## 2022-01-16 RX ADMIN — MULTIPLE VITAMINS W/ MINERALS TAB 1 TABLET: TAB at 08:51

## 2022-01-16 RX ADMIN — GABAPENTIN 300 MG: 300 CAPSULE ORAL at 20:13

## 2022-01-16 RX ADMIN — QUETIAPINE FUMARATE 200 MG: 200 TABLET ORAL at 20:13

## 2022-01-16 RX ADMIN — DIAZEPAM 10 MG: 5 TABLET ORAL at 17:56

## 2022-01-16 RX ADMIN — DIVALPROEX SODIUM 500 MG: 250 TABLET, FILM COATED, EXTENDED RELEASE ORAL at 20:13

## 2022-01-16 ASSESSMENT — ACTIVITIES OF DAILY LIVING (ADL)
DRESS: SCRUBS (BEHAVIORAL HEALTH)
ORAL_HYGIENE: INDEPENDENT
HYGIENE/GROOMING: INDEPENDENT
LAUNDRY: WITH SUPERVISION
HYGIENE/GROOMING: INDEPENDENT
DRESS: SCRUBS (BEHAVIORAL HEALTH)
LAUNDRY: WITH SUPERVISION
ORAL_HYGIENE: INDEPENDENT

## 2022-01-16 NOTE — PROGRESS NOTES
Patient appeared to rest well during noc shift, slept > 8 hours, no reports of pain or discomfort, no prn adm or requested, no behaviors noted or reported, 15 minute safety checks continue per protocol, will continue to monitor.

## 2022-01-16 NOTE — PLAN OF CARE
Problem: Behavioral Health Plan of Care  Goal: Adheres to Safety Considerations for Self and Others  Outcome: Improving  Goal: Develops/Participates in Therapeutic [place] to Support Successful Transition  Outcome: Improving     Problem: Suicidal Behavior  Goal: Suicidal Behavior is Absent or Managed  Outcome: Improving

## 2022-01-16 NOTE — PLAN OF CARE
Pt has been quiet, mostly isolative tonight. Complained to family member that he was hungry and wasn't getting enough to eat, despite the fact that he was served a second dinner tray at his request, and also was given snacks . CIWA score was 1 for clammy fingertips, otherwise no tremor, anxiety or diaphoresis.Denies all psych sx tonight. Will continue to monitor

## 2022-01-16 NOTE — PLAN OF CARE
Occupational Therapy   AIDET      Patient was introduced to the role of occupational therapy and oriented to the process of occupational therapy services on the unit, including function of groups. Patient was given the Occupational Therapy Questionnaire to complete. Patient in bed on approach. Pt agreeable to review form and follow up with OT at a later date. OT will follow up with assessment and address any questions, needs, or concerns.

## 2022-01-16 NOTE — PLAN OF CARE
Problem: Behavioral Health Plan of Care  Goal: Plan of Care Review  Outcome: No Change  Flowsheets  Taken 1/16/2022 1238  Plan of Care Reviewed With: patient  Taken 1/16/2022 1224  Plan of Care Reviewed With: patient  Patient Agreement with Plan of Care: agrees  Goal: Patient-Specific Goal (Individualization)  Outcome: No Change  Flowsheets (Taken 1/16/2022 1238)  Patient Vulnerabilities:   history of unsuccessful treatment   substance abuse/addiction  Note:     Goal: Adheres to Safety Considerations for Self and Others  Outcome: No Change  Intervention: Develop and Maintain Individualized Safety Plan  Recent Flowsheet Documentation  Taken 1/16/2022 1224 by Lucia Robles RN  Safety Measures: safety rounds completed  Goal: Absence of New-Onset Illness or Injury  Outcome: No Change  Intervention: Identify and Manage Fall Risk  Recent Flowsheet Documentation  Taken 1/16/2022 1224 by Lucia Robles RN  Safety Measures: safety rounds completed  Goal: Develops/Participates in Therapeutic [place] to Support Successful Transition  Outcome: No Change  Intervention: Foster Therapeutic [place]  Recent Flowsheet Documentation  Taken 1/16/2022 1224 by Lucia Robles RN  Trust Relationship/Rapport: care explained     Problem: Suicidal Behavior  Goal: Suicidal Behavior is Absent or Managed  Outcome: No Change  Problem: Behavioral Health Plan of Care  Goal: Plan of Care Review  Outcome: No Change  Flowsheets  Taken 1/16/2022 1238  Plan of Care Reviewed With: patient  Taken 1/16/2022 1224  Plan of Care Reviewed With: patient  Patient Agreement with Plan of Care: agrees  Goal: Patient-Specific Goal (Individualization)  Outcome: No Change  Flowsheets (Taken 1/16/2022 1238)  Patient Vulnerabilities:   history of unsuccessful treatment   substance abuse/addiction  Note:     Goal: Adheres to Safety Considerations for Self and Others  Outcome: No Change  Intervention: Develop and Maintain Individualized Safety Plan  Recent  "Flowsheet Documentation  Taken 1/16/2022 1224 by Lucia Robles, RN  Safety Measures: safety rounds completed  Goal: Absence of New-Onset Illness or Injury  Outcome: No Change  Intervention: Identify and Manage Fall Risk  Recent Flowsheet Documentation  Taken 1/16/2022 1224 by Lucia Robles, RN  Safety Measures: safety rounds completed  Goal: Develops/Participates in Therapeutic [place] to Support Successful Transition  Outcome: No Change  Intervention: Foster Therapeutic [place]  Recent Flowsheet Documentation  Taken 1/16/2022 1224 by Lucia Rolbes RN  Trust Relationship/Rapport: care explained     Problem: Suicidal Behavior  Goal: Suicidal Behavior is Absent or Managed  Outcome: No Change   Isolative in room Needs prompting to go for walk. Refused TV and music. Out for meals and ate all. Denies pain, Denies SI/HI/SIB/AH/VH. Denies being anxious or depressed \"just tired\". Med compliant. CIWA is 0. Cooperative and polite.  "

## 2022-01-16 NOTE — PROGRESS NOTES
01/16/22 1126   Engagement   Intervention Group   Topic Detail OT Creative Expressions group-Window cling coloring for social engagement, healthy distraction, creativity, relaxation, and symptom management   Attendance Did not attend

## 2022-01-17 PROBLEM — F51.05 INSOMNIA DUE TO OTHER MENTAL DISORDER: Status: ACTIVE | Noted: 2022-01-14

## 2022-01-17 PROBLEM — F10.20 ALCOHOL USE DISORDER, SEVERE, DEPENDENCE (H): Status: ACTIVE | Noted: 2022-01-14

## 2022-01-17 PROBLEM — F90.0 ADHD (ATTENTION DEFICIT HYPERACTIVITY DISORDER), INATTENTIVE TYPE: Status: ACTIVE | Noted: 2022-01-14

## 2022-01-17 PROBLEM — F25.9 SCHIZOPHRENIA, SCHIZOAFFECTIVE, CHRONIC WITH ACUTE EXACERBATION (H): Status: ACTIVE | Noted: 2022-01-14

## 2022-01-17 PROBLEM — F99 INSOMNIA DUE TO OTHER MENTAL DISORDER: Status: ACTIVE | Noted: 2022-01-14

## 2022-01-17 LAB
ALBUMIN SERPL-MCNC: 3.6 G/DL (ref 3.5–5)
ALP SERPL-CCNC: 66 U/L (ref 45–120)
ALT SERPL W P-5'-P-CCNC: 15 U/L (ref 0–45)
AST SERPL W P-5'-P-CCNC: 10 U/L (ref 0–40)
BILIRUB DIRECT SERPL-MCNC: <0.1 MG/DL
BILIRUB SERPL-MCNC: 0.3 MG/DL (ref 0–1)
CHOLEST SERPL-MCNC: 184 MG/DL
GLUCOSE BLD-MCNC: 102 MG/DL (ref 70–125)
HDLC SERPL-MCNC: 39 MG/DL
LDLC SERPL CALC-MCNC: 113 MG/DL
PROT SERPL-MCNC: 6.5 G/DL (ref 6–8)
SARS-COV-2 RNA RESP QL NAA+PROBE: NEGATIVE
TRIGL SERPL-MCNC: 159 MG/DL

## 2022-01-17 PROCEDURE — 99233 SBSQ HOSP IP/OBS HIGH 50: CPT | Performed by: PSYCHIATRY & NEUROLOGY

## 2022-01-17 PROCEDURE — 80061 LIPID PANEL: CPT | Performed by: PSYCHIATRY & NEUROLOGY

## 2022-01-17 PROCEDURE — 36415 COLL VENOUS BLD VENIPUNCTURE: CPT | Performed by: PSYCHIATRY & NEUROLOGY

## 2022-01-17 PROCEDURE — 82040 ASSAY OF SERUM ALBUMIN: CPT | Performed by: PSYCHIATRY & NEUROLOGY

## 2022-01-17 PROCEDURE — 82947 ASSAY GLUCOSE BLOOD QUANT: CPT | Performed by: PSYCHIATRY & NEUROLOGY

## 2022-01-17 PROCEDURE — 250N000013 HC RX MED GY IP 250 OP 250 PS 637

## 2022-01-17 PROCEDURE — 128N000001 HC R&B CD/MH ADULT

## 2022-01-17 PROCEDURE — 87635 SARS-COV-2 COVID-19 AMP PRB: CPT | Performed by: PSYCHIATRY & NEUROLOGY

## 2022-01-17 PROCEDURE — 250N000013 HC RX MED GY IP 250 OP 250 PS 637: Performed by: PSYCHIATRY & NEUROLOGY

## 2022-01-17 PROCEDURE — 250N000011 HC RX IP 250 OP 636: Performed by: PSYCHIATRY & NEUROLOGY

## 2022-01-17 RX ORDER — QUETIAPINE FUMARATE 300 MG/1
300 TABLET, FILM COATED ORAL AT BEDTIME
Status: DISCONTINUED | OUTPATIENT
Start: 2022-01-17 | End: 2022-01-19

## 2022-01-17 RX ORDER — MULTIPLE VITAMINS W/ MINERALS TAB 9MG-400MCG
1 TAB ORAL DAILY
Status: DISCONTINUED | OUTPATIENT
Start: 2022-01-18 | End: 2022-01-18

## 2022-01-17 RX ORDER — ACETAMINOPHEN 500 MG
1000 TABLET ORAL EVERY 8 HOURS PRN
Status: DISCONTINUED | OUTPATIENT
Start: 2022-01-17 | End: 2022-01-21 | Stop reason: HOSPADM

## 2022-01-17 RX ORDER — ATOMOXETINE 25 MG/1
25 CAPSULE ORAL DAILY
Status: DISCONTINUED | OUTPATIENT
Start: 2022-01-17 | End: 2022-01-21 | Stop reason: HOSPADM

## 2022-01-17 RX ORDER — PRAZOSIN HYDROCHLORIDE 1 MG/1
2 CAPSULE ORAL AT BEDTIME
Status: DISCONTINUED | OUTPATIENT
Start: 2022-01-17 | End: 2022-01-21 | Stop reason: HOSPADM

## 2022-01-17 RX ADMIN — Medication 100 MG: at 08:47

## 2022-01-17 RX ADMIN — DIVALPROEX SODIUM 500 MG: 250 TABLET, FILM COATED, EXTENDED RELEASE ORAL at 20:52

## 2022-01-17 RX ADMIN — HYDROXYZINE HYDROCHLORIDE 50 MG: 25 TABLET ORAL at 13:13

## 2022-01-17 RX ADMIN — FOLIC ACID 1 MG: 1 TABLET ORAL at 08:47

## 2022-01-17 RX ADMIN — ACETAMINOPHEN 1000 MG: 500 TABLET ORAL at 22:08

## 2022-01-17 RX ADMIN — GABAPENTIN 300 MG: 300 CAPSULE ORAL at 08:47

## 2022-01-17 RX ADMIN — ATOMOXETINE 25 MG: 25 CAPSULE ORAL at 14:00

## 2022-01-17 RX ADMIN — ONDANSETRON 4 MG: 4 TABLET, ORALLY DISINTEGRATING ORAL at 15:22

## 2022-01-17 RX ADMIN — OMEPRAZOLE 40 MG: 20 CAPSULE, DELAYED RELEASE ORAL at 08:47

## 2022-01-17 RX ADMIN — PRAZOSIN HYDROCHLORIDE 2 MG: 1 CAPSULE ORAL at 20:51

## 2022-01-17 RX ADMIN — GABAPENTIN 300 MG: 300 CAPSULE ORAL at 15:23

## 2022-01-17 RX ADMIN — MULTIPLE VITAMINS W/ MINERALS TAB 1 TABLET: TAB at 08:47

## 2022-01-17 RX ADMIN — QUETIAPINE FUMARATE 300 MG: 300 TABLET ORAL at 20:52

## 2022-01-17 RX ADMIN — HYDROXYZINE HYDROCHLORIDE 50 MG: 25 TABLET ORAL at 18:50

## 2022-01-17 RX ADMIN — GABAPENTIN 300 MG: 300 CAPSULE ORAL at 20:52

## 2022-01-17 ASSESSMENT — ACTIVITIES OF DAILY LIVING (ADL)
DRESS: INDEPENDENT
HYGIENE/GROOMING: INDEPENDENT
ORAL_HYGIENE: INDEPENDENT
LAUNDRY: WITH SUPERVISION
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
## 2022-01-17 NOTE — PLAN OF CARE
Problem: Behavioral Health Plan of Care  Goal: Absence of New-Onset Illness or Injury  Outcome: Improving  Intervention: Identify and Manage Fall Risk  Recent Flowsheet Documentation  Taken 1/17/2022 1400 by Martha Moses RN  Safety Measures: safety rounds completed   No falls, no injuries, and no infections noted.

## 2022-01-17 NOTE — PLAN OF CARE
Problem: Behavioral Health Plan of Care  Goal: Patient-Specific Goal (Individualization)  Outcome: Improving  Flowsheets  Taken 1/17/2022 1036 by Paulina Abdullahi MSW  Patient-Specific Goals (Include Timeframe): Patient's goal is to return to [place] upon discharge and continue to stabilize in the community.  Taken 1/16/2022 1238 by Lucia Robles, RN  Patient Vulnerabilities:   history of unsuccessful treatment   substance abuse/addiction  Taken 1/15/2022 1051 by Lucia Robles, RN  Patient Personal Strengths: expressive of needs     BEHAVIORAL TEAM DISCUSSION    Participants: Nurse- DARION, Provider- BC, Pharmacy- [place], Occupational Therapy- DR, - MW  Progress: Patient is currently voluntary and is progressing towards discharge home once stabilized.  Anticipated length of stay: 3-5 days  Continued Stay Criteria/Rationale: Symptom Stabilization and Discharge planning  Medical/Physical: CIWA and elevated blood pressure  Precautions:   Behavioral Orders   Procedures    Code 1 - Restrict to Unit    Routine Programming     As clinically indicated    Status 15     Every 15 minutes.    Suicide precautions     Patients on Suicide Precautions should have a Combination Diet ordered that includes a Diet selection(s) AND a Behavioral Tray selection for Safe Tray - with utensils, or Safe Tray - NO utensils       Plan: Plan is for patient to discharge to [place] with family once stabilized.  Rationale for change in precautions or plan: N/A

## 2022-01-17 NOTE — PROGRESS NOTES
01/17/22 1054   Engagement   Intervention Group   Topic Wellness strategies   Topic Detail Exercise Dice and Wellness Education   Attendance Did not attend   Reason for Not Attending Refused  (requested to remain in bed)

## 2022-01-17 NOTE — PLAN OF CARE
Assessment/Intervention, Care Coordination and Current Symptoms:   [facility] consulted with psychiatrist and met with patient. He denies any psych symptoms and denies SI/HI. He presented as dismissive during this interaction. Patient reports plan to return home this week once he is able. Patient denies any questions or concerns for CTC at this time.     [facility] spoke with patient's father, Mark [phone], to provide a clinical update. Mark discussed patient's mental health history including diagnoses of Anorexia and Schizoaffective. He agrees with plan to restart medications to help stabilize patient. He requests one day in advance notice prior to discharge so he is able to purchase patient a plane ticket home. NIRMAL in chart.     [facility] spoke with patient's mother, Christ [phone], to provide an update. She requested upon discharge patient's medications be sent to [facility] [address], [place] [address]. NIRMAL in chart.      Discharge Plan or Goal:  Home with outpatient supports        Barriers to Discharge:  symptom stabilization, medication management, coordination of care        Referral Status:    None at this time    Patient's mother, Christ [phone], is scheduling appointments for patient with established providers in [place], [place].     Legal Status:  Voluntary     Linda Dickerson, [facility], 1/17/2022, 2:52 PM

## 2022-01-17 NOTE — PLAN OF CARE
"   01/17/22 1100   Occupational Therapy Psychosocial Assessment   Assessment Type Interview;Chart Review   Prior Level of Function   People in home other (see comments);sibling(s);parent(s)  (lives in [place] with parents; pt denied having siblings)   Current Living Arrangements house  (Moved to  a few years ago)   Transportation Anticipated family or friend will provide  (\"I miss living in [place]- I could walk everywhere there\")   ADLs   Activity/Exercise/Self-Care Comment \"I can run 5 miles and I don't even work out\"   Instrumental Activities of Daily Living (IADL)   Previous Responsibilities yardwork;medication management;work   Functional Mobility   Equipment Currently Used at Home none   Therapy Assessment   Patient Presentation Pt declined to participate in OT group this morning. Willing to meet with OT, but declined to leave his room. Shared that he \"said some thing that I didn't mean so I ended up here\" referring to making suicidal statements and his family being concerned. Pt endorses motivation to return to [place]  and start working. Struggled to answer questions, often saying 1-2 word responses. Pt was polite but conversation remained surface level. Denies need to be in the hospital.    Patient-identified areas of success Living independently;Time spent with family or friends   Patient-identified areas of difficulty Other (see comment);Lack of satisfying daily routine  (When asked about hobbies, stated \"I like to drink\")   Patient-identified sources of support Family, friends or caregivers to help when needed;Belief in self and abilities   Patient-identified emotional, physical or mental health concerns Did not share   Patient-identified coping stategies for these concerns music, working   Patient-identified stressors or changes in the past year did not share   Patient-identified values did not share   Patient's goal for the future \"Get a job\"   Patient's goals to work on with OT Other (see comments)  (did " not express interest in joining OT programming)   Clinical Impression   Patient Personal Strengths expressive of needs;family/social support;independent living skills   Pt appears to have the following barriers/limitations Limited insight into mental health symptoms;Medication noncompliance;Poorly managed mental health symptoms;Lack of daily routine   Patient's barriers/limitations contribute to Exacerbation of mental health symptoms;Limited cooperation with care team;Limited active engagement in recovery strategies   Planned Interventions Encourage group attendance;Identify and develop coping strategies   Beh OT Plan for Next Session engage in 3 OT groups this week     Later in the day, pt returned the OT interview form. Completed the followin/17/22 1400   Therapy Assessment   Patient-identified areas of success Work or volunteering;Living independently;Transportation;Managing basic needs (food, medicine, sleep)   Patient-identified areas of difficulty Difficulty concentrating;Memory problems;Physical health/Chronic Pain;Lacks support;Using drugs or alcohol;Sleeping too much or not enough;Missing work/appointments;Transportation   Patient-identified sources of support Safe place to live;Family, friends or caregivers to help when needed;Belief in self and abilities;Access to email, social media, phone calls   Patient-identified emotional, physical or mental health concerns difficulty concentrating   Patient-identified coping stategies for these concerns nothing   Patient-identified stressors or changes in the past year my life   Patient-identified values my future   Patient's goal for the future have a life I love   Patient's goals to work on with OT Feel better;Share own thoughts and feelings;Improve sleep

## 2022-01-17 NOTE — PROGRESS NOTES
Pt blood pressure noted to be elevated to 158/98. He stated it was due to the fact he had just had a nightmare. RE check 10 minutes later, B/p 157/100, pulse 107. Hands are cold and clammy, he has a noticeable tremor in fingertips and tongue. Appear restless and fidgety and admits to feeling very anxious. CIWA score 10 due to these sx/complaints. Medicated with valium 10 mg per CIWA protocol. Will monitor closely.

## 2022-01-17 NOTE — PLAN OF CARE
Problem: Behavioral Health Plan of Care  Goal: Adheres to Safety Considerations for Self and Others  Outcome: Improving  Goal: Develops/Participates in Therapeutic [place] to Support Successful Transition  Outcome: Improving     Problem: Suicidal Behavior  Goal: Suicidal Behavior is Absent or Managed  Outcome: Improving     Problem: Sleep Disturbance  Goal: Adequate Sleep/Rest  Outcome: Improving

## 2022-01-17 NOTE — CONSULTS
"CLINICAL NUTRITION SERVICES  -  ASSESSMENT NOTE      RECOMMENDATIONS FOR MD/PROVIDER TO ORDER:   Blind weights   Recommendations Ordered by Registered Dietitian (RD):   MVI/M  Continue regular diet  No regular coffee   Malnutrition:   Patient does not meet two of the above criteria necessary for diagnosing malnutrition     REASON FOR ASSESSMENT  Willy Flores is a 26 year old male seen by Registered Dietitian for Admission Nutrition Risk Screen for positive    NUTRITION HISTORY  PMH: schizophrenia, ADHD, anxiety, anorexia, Crohn's colitis, narcolepsy  - He came to the emergency room for alcohol intoxication.  He was drinking vodka and he took 5 pills of melatonin tablets trying to sleep.  - Irritable bowel syndrome with both constipation and diarrhea    - Information obtained from patient  - Diet history:  Pt. States that he has had a poor appetite for the past 1 year, he has been eating 1 meal/day, sometimes just a cookie and most of his caloric intake is from alcohol  - 2 large bottles of tequila and/or vodka/day = 1500 mL liquor/day  Total = 3275 kcal/day from alcohol    CURRENT NUTRITION ORDERS  Diet Order:     Orders Placed This Encounter      Regular Diet Adult    Current Intake/Tolerance:  pt. Eating all meals, requesting extra trays and snacks as well  - pt. Requesting coffee and appetite suppressants, he is concerned that he is eating too much food and says he is \"fat\" and needs to eat less food.  - consistent with diagnosis of anorexia    NUTRITION FOCUSED PHYSICAL ASSESSMENT FOR DIAGNOSING MALNUTRITION)  Yes         Observed:    No nutrition-related physical findings observed    ANTHROPOMETRICS  Height: Data Unavailable  Weight: 156 lbs 0 oz  Body mass index is 25.18 kg/m .  Weight Status:  Overweight BMI 25-29.9  Weight History:  2.5% weight loss in 1 month  6.6% weight loss in 2 months - meets criteria for moderate weight loss  Wt Readings from Last 10 Encounters:   01/15/22 70.8 kg (156 lb)   01/13/22 " 70.3 kg (155 lb)     72.1 kg (159 lb) 12/22/2021     75.3 kg (166 lb) 11/24/2021     66.7 kg (147 lb) 10/28/2021     66.7 kg (147 lb) 06/25/2021     79.8 kg (176 lb) 09/12/2020     LABS  Labs reviewed  Labs:  Electrolytes  Potassium (mmol/L)   Date Value   01/14/2022 3.6   01/13/2022 3.3 (L)     Phosphorus (mg/dL)   Date Value   01/14/2022 3.0    Blood Glucose  Glucose (mg/dL)   Date Value   01/17/2022 102   01/14/2022 99   01/13/2022 120 (H)    Inflammatory Markers  Albumin (g/dL)   Date Value   01/17/2022 3.6   01/14/2022 4.4      Sodium (mmol/L)   Date Value   01/14/2022 137   01/13/2022 142    Renal  Urea Nitrogen (mg/dL)   Date Value   01/14/2022 6 (L)   01/13/2022 6 (L)     Creatinine (mg/dL)   Date Value   01/14/2022 0.75   01/13/2022 0.53 (L)     Additional  Triglycerides (mg/dL)   Date Value   01/17/2022 159 (H)        MEDICATIONS  Medications reviewed    atomoxetine  25 mg Oral Daily     divalproex sodium extended-release  500 mg Oral At Bedtime     folic acid  1 mg Oral Daily     gabapentin  300 mg Oral TID     multivitamin, therapeutic  1 tablet Oral Daily     omeprazole  40 mg Oral Daily     prazosin  2 mg Oral At Bedtime     QUEtiapine  200 mg Oral At Bedtime     thiamine  100 mg Oral Daily         alum & mag hydroxide-simethicone, diazepam, docusate sodium, gabapentin, hydrOXYzine, melatonin, ondansetron, traZODone     ASSESSED NUTRITION NEEDS PER APPROVED PRACTICE GUIDELINES:  Dosing Weight 71 kg  Estimated Energy Needs: 25-30 Kcal/Kg  Justification: maintenance  Estimated Protein Needs: 1-1.2 g pro/Kg  Justification: maintenance and Repletion  Estimated Fluid Needs: 1 mL/Kcal  Justification: maintenance    MALNUTRITION:  % Weight Loss:  Up to 5% in 1 month (moderate malnutrition)  % Intake:  No decreased intake noted  Subcutaneous Fat Loss:  None observed  Muscle Loss:  None observed  Fluid Retention:  None noted    Malnutrition Diagnosis: Patient does not meet two of the above criteria necessary  for diagnosing malnutrition    NUTRITION DIAGNOSIS:  Inadequate protein, fat, micronutrient, fiber intake related to alcoholism as evidenced by pt. Drinking 1.5L liquor/day and minimal food.    NUTRITION INTERVENTIONS  Recommendations / Nutrition Prescription  MVI/M  Continue regular diet  Blind weights  No regular coffee    Implementation  Nutrition education: Provided education on eating a balanced diet, needing to eat food regularly  Composition of Meals and Snacks, General/healthful diet and Multivitamin/Mineral    Nutrition Goals  Maintain weight 70.8 kg +/- 1-2kg  Meal intakes >50% TID    MONITORING AND EVALUATION:  Progress towards goals will be monitored and evaluated per protocol and Practice Guidelines      Rosita Boss RDN, LD  Clinical Dietitian

## 2022-01-17 NOTE — PLAN OF CARE
Pt reports feeling much better. B/P 134/80, HR 89. CIWA score 1. He denies all psych sx. Has been medication compliant. Currently sitting quietly working on a puzzle. He has been visible on unit and social with other peers later in evening. No signs of acute distress. Will continue to monitor

## 2022-01-17 NOTE — PROGRESS NOTES
Patient appeared to rest well throughout noc shift, no reports of pain or discomfort, no precautionary behaviors noted or reported, no prn's adm or requested, up x 2 for snack, 15 minute safety checks performed per protocol, will continue to monitor.

## 2022-01-17 NOTE — PROGRESS NOTES
B/P 143/86, HR 99. He is sitting quietly watching tv. States he is feeling much better. Denies anxiety. Hands and wrm, slightly damp, fine tremor discerned. CIWA 4. Will cotinue to monitor

## 2022-01-18 PROBLEM — F43.10 PTSD (POST-TRAUMATIC STRESS DISORDER): Status: ACTIVE | Noted: 2022-01-17

## 2022-01-18 LAB
ALBUMIN SERPL-MCNC: 4.1 G/DL (ref 3.5–5)
ALBUMIN UR-MCNC: NEGATIVE MG/DL
ALP SERPL-CCNC: 77 U/L (ref 45–120)
ALT SERPL W P-5'-P-CCNC: 23 U/L (ref 0–45)
ANION GAP SERPL CALCULATED.3IONS-SCNC: 12 MMOL/L (ref 5–18)
APPEARANCE UR: CLEAR
AST SERPL W P-5'-P-CCNC: 14 U/L (ref 0–40)
BASOPHILS # BLD AUTO: 0 10E3/UL (ref 0–0.2)
BASOPHILS NFR BLD AUTO: 1 %
BILIRUB SERPL-MCNC: 0.3 MG/DL (ref 0–1)
BILIRUB UR QL STRIP: NEGATIVE
BUN SERPL-MCNC: 12 MG/DL (ref 8–22)
C REACTIVE PROTEIN LHE: 1 MG/DL (ref 0–0.8)
CALCIUM SERPL-MCNC: 10 MG/DL (ref 8.5–10.5)
CHLORIDE BLD-SCNC: 106 MMOL/L (ref 98–107)
CO2 SERPL-SCNC: 24 MMOL/L (ref 22–31)
COLOR UR AUTO: COLORLESS
CREAT SERPL-MCNC: 0.76 MG/DL (ref 0.7–1.3)
EOSINOPHIL # BLD AUTO: 0 10E3/UL (ref 0–0.7)
EOSINOPHIL NFR BLD AUTO: 1 %
ERYTHROCYTE [DISTWIDTH] IN BLOOD BY AUTOMATED COUNT: 11.9 % (ref 10–15)
GFR SERPL CREATININE-BSD FRML MDRD: >90 ML/MIN/1.73M2
GLUCOSE BLD-MCNC: 57 MG/DL (ref 70–125)
GLUCOSE BLDC GLUCOMTR-MCNC: 123 MG/DL (ref 70–99)
GLUCOSE UR STRIP-MCNC: NEGATIVE MG/DL
HCT VFR BLD AUTO: 45.8 % (ref 40–53)
HGB BLD-MCNC: 15.8 G/DL (ref 13.3–17.7)
HGB UR QL STRIP: NEGATIVE
IMM GRANULOCYTES # BLD: 0 10E3/UL
IMM GRANULOCYTES NFR BLD: 1 %
KETONES UR STRIP-MCNC: NEGATIVE MG/DL
LACTATE SERPL-SCNC: 1.6 MMOL/L (ref 0.7–2)
LEUKOCYTE ESTERASE UR QL STRIP: NEGATIVE
LIPASE SERPL-CCNC: <9 U/L (ref 0–52)
LYMPHOCYTES # BLD AUTO: 1.1 10E3/UL (ref 0.8–5.3)
LYMPHOCYTES NFR BLD AUTO: 24 %
MCH RBC QN AUTO: 33.9 PG (ref 26.5–33)
MCHC RBC AUTO-ENTMCNC: 34.5 G/DL (ref 31.5–36.5)
MCV RBC AUTO: 98 FL (ref 78–100)
MONOCYTES # BLD AUTO: 0.6 10E3/UL (ref 0–1.3)
MONOCYTES NFR BLD AUTO: 14 %
NEUTROPHILS # BLD AUTO: 2.7 10E3/UL (ref 1.6–8.3)
NEUTROPHILS NFR BLD AUTO: 59 %
NITRATE UR QL: NEGATIVE
NRBC # BLD AUTO: 0 10E3/UL
NRBC BLD AUTO-RTO: 0 /100
PH UR STRIP: 6.5 [PH] (ref 5–7)
PLATELET # BLD AUTO: 214 10E3/UL (ref 150–450)
POTASSIUM BLD-SCNC: 3.6 MMOL/L (ref 3.5–5)
PROT SERPL-MCNC: 7.9 G/DL (ref 6–8)
RBC # BLD AUTO: 4.66 10E6/UL (ref 4.4–5.9)
SODIUM SERPL-SCNC: 142 MMOL/L (ref 136–145)
SP GR UR STRIP: 1 (ref 1–1.03)
UROBILINOGEN UR STRIP-MCNC: <2 MG/DL
WBC # BLD AUTO: 4.4 10E3/UL (ref 4–11)

## 2022-01-18 PROCEDURE — 83690 ASSAY OF LIPASE: CPT | Performed by: NURSE PRACTITIONER

## 2022-01-18 PROCEDURE — 81003 URINALYSIS AUTO W/O SCOPE: CPT | Performed by: NURSE PRACTITIONER

## 2022-01-18 PROCEDURE — 86140 C-REACTIVE PROTEIN: CPT | Performed by: NURSE PRACTITIONER

## 2022-01-18 PROCEDURE — 250N000013 HC RX MED GY IP 250 OP 250 PS 637: Performed by: PSYCHIATRY & NEUROLOGY

## 2022-01-18 PROCEDURE — 83605 ASSAY OF LACTIC ACID: CPT | Performed by: NURSE PRACTITIONER

## 2022-01-18 PROCEDURE — 80053 COMPREHEN METABOLIC PANEL: CPT | Performed by: NURSE PRACTITIONER

## 2022-01-18 PROCEDURE — 85025 COMPLETE CBC W/AUTO DIFF WBC: CPT | Performed by: NURSE PRACTITIONER

## 2022-01-18 PROCEDURE — 128N000001 HC R&B CD/MH ADULT

## 2022-01-18 PROCEDURE — 36415 COLL VENOUS BLD VENIPUNCTURE: CPT | Performed by: NURSE PRACTITIONER

## 2022-01-18 PROCEDURE — 99233 SBSQ HOSP IP/OBS HIGH 50: CPT | Performed by: PSYCHIATRY & NEUROLOGY

## 2022-01-18 RX ORDER — NALTREXONE HYDROCHLORIDE 50 MG/1
50 TABLET, FILM COATED ORAL DAILY
Status: DISCONTINUED | OUTPATIENT
Start: 2022-01-19 | End: 2022-01-21 | Stop reason: HOSPADM

## 2022-01-18 RX ADMIN — PRAZOSIN HYDROCHLORIDE 2 MG: 1 CAPSULE ORAL at 20:26

## 2022-01-18 RX ADMIN — OMEPRAZOLE 40 MG: 20 CAPSULE, DELAYED RELEASE ORAL at 08:26

## 2022-01-18 RX ADMIN — GABAPENTIN 300 MG: 300 CAPSULE ORAL at 20:26

## 2022-01-18 RX ADMIN — GABAPENTIN 300 MG: 300 CAPSULE ORAL at 14:24

## 2022-01-18 RX ADMIN — FOLIC ACID 1 MG: 1 TABLET ORAL at 08:27

## 2022-01-18 RX ADMIN — HYDROXYZINE HYDROCHLORIDE 50 MG: 25 TABLET ORAL at 18:36

## 2022-01-18 RX ADMIN — GABAPENTIN 300 MG: 300 CAPSULE ORAL at 08:27

## 2022-01-18 RX ADMIN — ATOMOXETINE 25 MG: 25 CAPSULE ORAL at 08:27

## 2022-01-18 RX ADMIN — QUETIAPINE FUMARATE 300 MG: 300 TABLET ORAL at 20:26

## 2022-01-18 RX ADMIN — Medication 100 MG: at 08:27

## 2022-01-18 RX ADMIN — MULTIPLE VITAMINS W/ MINERALS TAB 1 TABLET: TAB at 08:27

## 2022-01-18 NOTE — PLAN OF CARE
Problem: Behavioral Health Plan of Care  Goal: Adheres to Safety Considerations for Self and Others  Outcome: Improving  Goal: Develops/Participates in Therapeutic [place] to Support Successful Transition  Outcome: Improving     Problem: Sleep Disturbance  Goal: Adequate Sleep/Rest  Outcome: Improving

## 2022-01-18 NOTE — PROGRESS NOTES
01/18/22 1447   Engagement   Intervention Group   Topic Detail Journaling group for conentration, coping, improving self awareness, processing, deepening insight, symptom management, emotional expression, relapse prevention, and socialization   Attendance Did not attend

## 2022-01-18 NOTE — PLAN OF CARE
Problem: Behavioral Health Plan of Care  Goal: Plan of Care Review  Outcome: Improving  Goal: Adheres to Safety Considerations for Self and Others  Outcome: Improving  Goal: Absence of New-Onset Illness or Injury  Outcome: Improving  Goal: Optimized Coping Skills in Response to Life Stressors  Outcome: Improving  Goal: Develops/Participates in Therapeutic [place] to Support Successful Transition  Outcome: Improving     Problem: Suicidal Behavior  Goal: Suicidal Behavior is Absent or Managed  Outcome: Improving     Problem: Sleep Disturbance  Goal: Adequate Sleep/Rest  Outcome: Improving   Pt has been up on the unit part of this shift. Pt has not had any nausea or emesis this shift. Pt rates anxiety 7/10, stating that it's 1000% better. Pt has been medication compliant. Pt notified of new orders from provider.

## 2022-01-18 NOTE — PLAN OF CARE
Assessment/Intervention, Care Coordination and Current Symptoms:   [facility] consulted with psychiatrist and met with patient. He was laying down in bed upon approach, but sat up when his name was called. Patient was cooperative, but guarded during this interaction. He rates current depression 0/10, anxiety 7/10, and denies any SI/HI. Patient denies any hallucinations. [facility] encouraged patient to spend more time outside of his room to reduce isolation. He plans to try this today. Denies any other questions or concerns at this time.     [facility] spoke with patient's mother, Christ [phone], using the language line and Croatian interpretor. [facility] provided updates including tentantive anticipated discharge for 1/21/22. Christ reports she is scheduling patient with outpatient appointments near his home [place].     [facility] spoke with patient's father, Mark [phone], to provide an update. NIRMAL in chart.     Discharge Plan or Goal:  Home with outpatient supports        Barriers to Discharge:  symptom stabilization, medication management, coordination of care        Referral Status:    None at this time     Patient's mother, Christ [phone], is scheduling appointments for patient with established providers in [place], [place].     Legal Status:  Voluntary     Linda Dickerson, [facility], 1/18/2022, 3:07 PM

## 2022-01-18 NOTE — PROGRESS NOTES
PSYCHIATRY PROGRESS NOTE         DATE OF SERVICE:   1/18/2022         CHIEF COMPLAINT:     Depression, paranoia, medication adjustment           OBJECTIVE:     Nursing reports : Patient takes medications, starting spending some time in the [facility] services reports working on outpatient referrals    Registered dietitian consult Rosita Boss RD on 1/17/2022  Recommended to continue regular diet no diagnosis of malnutrition         SUBJECTIVE:      Willy has been taking his medications.  He says he feels a little better.  He says that he vomited once and Zofran helped.  He has Crohn's disease.  I will order hospitalist consult.  He denies suicidal and homicidal ideas.  He says that paranoia is decreasing.  He denies hallucinations.  He says he was waking up during the night, but he was able to fall asleep.  He says that energy and concentrations are decreased, but there is some improvement from admission.He has depression.  He participated in 3 occupational therapy groups since admission, but he refused most of groups yesterday.  He is on [facility] protocol.  He denies cravings for alcohol.  We discussed Naltrexone to help with alcohol craving and he agrees. His liver enzymes are negative..         MEDICATIONS:       atomoxetine  25 mg Oral Daily     folic acid  1 mg Oral Daily     gabapentin  300 mg Oral TID     multivitamin, therapeutic  1 tablet Oral Daily     omeprazole  40 mg Oral Daily     prazosin  2 mg Oral At Bedtime     QUEtiapine  300 mg Oral At Bedtime     thiamine  100 mg Oral Daily     acetaminophen, alum & mag hydroxide-simethicone, diazepam, docusate sodium, gabapentin, hydrOXYzine, melatonin, ondansetron, traZODone    Medication adherence: Yes  Medication side effects: No  Benefit: Symptom reduction         ROS:   As per history of present illness, otherwise reminder of review of systems is negative for: General, eyes, ears, nose, throat, neck, respiratory, cardiovascular, gastrointestinal,  genitourinary, meniscal skeletal, neurological, hematological, dermatological and endocrine system.         MENTAL STATUS EXAM:   /66 (BP Location: Right arm, Patient Position: Sitting)   Pulse 76   Temp 98  F (36.7  C) (Oral)   Resp 16   Wt 70.8 kg (156 lb)   SpO2 99%   BMI 25.18 kg/m      Appearance:fair hygiene  Orientation:x3  Speech:fluent  Language ability: normal syntax and vocabulary  Thought process: concrete  Thought content: positive for paranoid  delusions , denies  hallucinations  Associations: Connected  Suicidal Ideation: denies   Homicidal Ideation:denies   Mood: depressed    Affect: irritable   Intellectual functioning:average  Fund of Knowledge: consistent with education and experience   Attention/Concentration: decreased  Memory: intact  Psychomotor Behavior: calm  Muscle Strength and Tone: no atrophy or involuntary movement  Gait and Station: steady  Insight and judgement:fair in terms of that he wants help         LABS:   personally reviewed.     Urine toxicology screen negative  COVID-19 test negative  Alcohol level 0.270  Salicylate and acetaminophen level negative  Fasting glucose 102  Potassium 3.6  Creatinine 0.75  ALT 15  AST 10  Alkaline phosphatase 66    Lab Results   Component Value Date     01/14/2022     01/13/2022    CO2 23 01/14/2022    CO2 22 01/13/2022    BUN 6 01/14/2022    BUN 6 01/13/2022       Lab Results   Component Value Date    CHOL 184 01/17/2022    TRIG 159 01/17/2022    HDL 39 01/17/2022       Recent Results (from the past 24 hour(s))   Asymptomatic COVID-19 Virus (Coronavirus) by PCR Nasopharyngeal    Collection Time: 01/17/22  6:48 PM    Specimen: Nasopharyngeal; Swab   Result Value Ref Range    SARS CoV2 PCR Negative Negative       Ref. Range 1/14/2022 11:26 1/17/2022 08:45 1/17/2022 18:48   Sodium Latest Ref Range: 136 - 145 mmol/L 137     Potassium Latest Ref Range: 3.5 - 5.0 mmol/L 3.6     Chloride Latest Ref Range: 98 - 107 mmol/L 103      Carbon Dioxide Latest Ref Range: 22 - 31 mmol/L 23     Urea Nitrogen Latest Ref Range: 8 - 22 mg/dL 6 (L)     Creatinine Latest Ref Range: 0.70 - 1.30 mg/dL 0.75     GFR Estimate Latest Ref Range: >60 mL/min/1.73m2 >90     Calcium Latest Ref Range: 8.5 - 10.5 mg/dL 9.5     Anion Gap Latest Ref Range: 5 - 18 mmol/L 11     Phosphorus Latest Ref Range: 2.5 - 4.5 mg/dL 3.0     Albumin Latest Ref Range: 3.5 - 5.0 g/dL 4.4 3.6    Protein Total Latest Ref Range: 6.0 - 8.0 g/dL  6.5    Bilirubin Total Latest Ref Range: 0.0 - 1.0 mg/dL  0.3    Alkaline Phosphatase Latest Ref Range: 45 - 120 U/L  66    ALT Latest Ref Range: 0 - 45 U/L  15    AST Latest Ref Range: 0 - 40 U/L  10    Bilirubin Direct Latest Ref Range: <=0.5 mg/dL  <0.1    Cholesterol Latest Ref Range: <=199 mg/dL  184    HDL Cholesterol Latest Ref Range: >=40 mg/dL  39 (L)    LDL Cholesterol Calculated Latest Ref Range: <=129 mg/dL  113    Triglycerides Latest Ref Range: <=149 mg/dL  159 (H)    Glucose Latest Ref Range: 70 - 125 mg/dL 99 102    SARS CoV2 PCR Latest Ref Range: Negative    Negative         DIAGNOSIS:     Schizophrenia, schizoaffective disorder chronic with acute exacerbation  Posttraumatic stress disorder  ADHD inattentive type  Alcohol use disorder severe dependency  Insomnia due to other mental    Patient Active Problem List   Diagnosis     Suicidal ideation     Noncompliance with medication regimen     History of psychiatric disorder     Ingestion of substance, intentional self-harm, initial encounter (H)     Mood disorder (H)     Schizophrenia, schizoaffective, chronic with acute exacerbation (H)     Alcohol use disorder, severe, dependence (H)     Insomnia due to other mental disorder     ADHD (attention deficit hyperactivity disorder), inattentive type     PTSD (post-traumatic stress disorder)          PLAN:   Patient and I discussed diagnosis and treatment plan.  He agrees to stay on the voluntary basis.  He is from [place].  He will go  back to [place] after mental health stabilization in the hospital.  He agrees with the following recommendations:    Medications:  Start naltrexone 50 mg daily for alcohol, liver enzymes are normal  Start Zoloft 50 mg qam for depression   Seroquel 300 mg nightly for psychosis, sleep, mood and anxiety  Strattera 25 mg daily for ADHD  Prazosin 2 mg nightly for nightmares of PTSD  Neurontin 300 mg 3 times daily for anxiety  Discontinue Depakote due to risk for liver when drinking   Vistaril 50 mg every 6 hours as needed for anxiety  Trazodone 100 mg at at bedtime for sleep  We discussed side effects, benefits and alternative treatments and patient agrees .  Rule 25 for chemical dependency treatment   will collect collateral information and make outpatient referrals  Staff to provide emotional support and redirect as needed  Patient encouraged to attend groups  Lab results: Reviewed personally  Consultation: Hospitalist consult for vomiting.  He has Crohn's disease    Risk Assessment: Paranoia, depression, mood lability    Coordination of Care:   Patient seen, medical record reviewed, care coordinated with the team.    Total time:  More than 35 minutes spent on this visit with more than 50% time  spent on coordination of care with staff,  educating patient about treatment options, side effects and benefits and alternative treatments for medications, providing supportive therapy regarding above symptoms.    This document is created with the help of Dragon dictation system.  All grammatical/typing errors or context distortion are unintentional and inherent to software.    Cynthia Ge MD        Re-Certification I certify that the inpatient psychiatric facility services furnished since the previous certification were, and continue to be, medically necessary for, either, treatment which could reasonably be expected to improve the patient s condition or diagnostic study and that the hospital records indicate  that the services furnished were, either, intensive treatment services, admission and related services necessary for diagnostic study, or equivalent services.     I certify that the patient continues to need, on a daily basis, active treatment furnished directly by or requiring the supervision of inpatient psychiatric facility personnel.   I estimate TBD days of hospitalization is necessary for proper treatment of the patient. My plans for post-hospital care for this patient are : Medications, appointments     Cynthia Ge MD

## 2022-01-18 NOTE — PROGRESS NOTES
"   01/18/22 1315   Engagement   Intervention Group   Topic Detail OT: Education on journaling and hands on creative endeavor (journal decorating) to increase insight development, creative expression, attention to task/detail, task follow through, problem solving, symptom management, and recovery planning   Attendance Attended   Patient Response Needs reinforcement/repetition to learn materials;Prosocial behavior;Contributes to conversation   Concentrated on Task 15 - 30 min   Cognition Goal-directed;Follows through with task;Distractible   Mood/Affect Content   Social/Behavioral Cooperative;Engaged   Thought Content [place]     Pt reported during check-in that he enjoys \"doing what I need to do\" as a healthy coping skill. Pt sat among peers to complete activity and contributed to conversation when prompted by therapist. Pt engaged in social interactions with peers when conversation was initiated by peers. Pt worked in a quick and semi-messy manner to complete project and required prompting from therapist to add more detail. Pt appeared unaware of his messy workspace and did not clean-up supplies. Pt in group area for duration but struggled to concentrate and attend to task. Pt progressing towards goal.  "

## 2022-01-18 NOTE — PROGRESS NOTES
Pt was visible on the unit, social with both peers and staff. Attended unit activities and ate 100% meals. Was pleasant, cooperative, and med compliant. Rated anxiety at 6/10. PRN 50 mg hydralazine  effective. Denied all other psych symptoms. No verbal outbursts and no behavioral issues noted. CIWA 2. Reports 6/10 anxiety. PRN Hydralazine 50 mg x 2 effective. Had Emesis x 1, PRN Zofran effective. Reported 6/10 headache. PRN Tylenol 1000 mg effective. Denies all other psych sx. Quiet. cooperative, medication compliant.

## 2022-01-18 NOTE — PROGRESS NOTES
Patient appeared to rest well during noc shift, up x 1 for snack, no reports of pain or discomfort, no precautionary behaviors noted or reported, no prn's adm or requested, 15 minute safety checks performed per protocol, will continue to monitor.

## 2022-01-18 NOTE — PROGRESS NOTES
Notified by RN hospital medicine service for nausea and vomiting in setting of crohn's and alcohol abuse.  Labs and imaging ordered and will be followed up.    Ondansetron already on MAR. So far Zofran has been utilized once on 01/17. Placed on clear liquid diet.    Formal consult 01/19/22      TIN Castro, CNP  Hospital Medicine Service  [facility]

## 2022-01-19 ENCOUNTER — APPOINTMENT (OUTPATIENT)
Dept: RADIOLOGY | Facility: CLINIC | Age: 27
End: 2022-01-19
Attending: NURSE PRACTITIONER

## 2022-01-19 ENCOUNTER — APPOINTMENT (OUTPATIENT)
Dept: INTERPRETER SERVICES | Facility: CLINIC | Age: 27
End: 2022-01-19

## 2022-01-19 PROCEDURE — 99253 IP/OBS CNSLTJ NEW/EST LOW 45: CPT

## 2022-01-19 PROCEDURE — 74019 RADEX ABDOMEN 2 VIEWS: CPT

## 2022-01-19 PROCEDURE — 250N000013 HC RX MED GY IP 250 OP 250 PS 637: Performed by: PSYCHIATRY & NEUROLOGY

## 2022-01-19 PROCEDURE — 99233 SBSQ HOSP IP/OBS HIGH 50: CPT | Performed by: PSYCHIATRY & NEUROLOGY

## 2022-01-19 PROCEDURE — 128N000001 HC R&B CD/MH ADULT

## 2022-01-19 RX ORDER — PANTOPRAZOLE SODIUM 40 MG/1
40 TABLET, DELAYED RELEASE ORAL 2 TIMES DAILY
Status: DISCONTINUED | OUTPATIENT
Start: 2022-01-20 | End: 2022-01-21 | Stop reason: HOSPADM

## 2022-01-19 RX ORDER — QUETIAPINE FUMARATE 200 MG/1
400 TABLET, FILM COATED ORAL AT BEDTIME
Status: DISCONTINUED | OUTPATIENT
Start: 2022-01-19 | End: 2022-01-21 | Stop reason: HOSPADM

## 2022-01-19 RX ADMIN — Medication 100 MG: at 10:00

## 2022-01-19 RX ADMIN — GABAPENTIN 300 MG: 300 CAPSULE ORAL at 10:00

## 2022-01-19 RX ADMIN — MULTIPLE VITAMINS W/ MINERALS TAB 1 TABLET: TAB at 10:00

## 2022-01-19 RX ADMIN — QUETIAPINE FUMARATE 400 MG: 200 TABLET ORAL at 20:50

## 2022-01-19 RX ADMIN — FOLIC ACID 1 MG: 1 TABLET ORAL at 09:59

## 2022-01-19 RX ADMIN — SERTRALINE HYDROCHLORIDE 50 MG: 50 TABLET ORAL at 09:59

## 2022-01-19 RX ADMIN — PRAZOSIN HYDROCHLORIDE 2 MG: 1 CAPSULE ORAL at 20:24

## 2022-01-19 RX ADMIN — NALTREXONE HYDROCHLORIDE 50 MG: 50 TABLET, FILM COATED ORAL at 10:00

## 2022-01-19 RX ADMIN — GABAPENTIN 300 MG: 300 CAPSULE ORAL at 20:24

## 2022-01-19 RX ADMIN — HYDROXYZINE HYDROCHLORIDE 50 MG: 25 TABLET ORAL at 11:54

## 2022-01-19 RX ADMIN — OMEPRAZOLE 40 MG: 20 CAPSULE, DELAYED RELEASE ORAL at 10:00

## 2022-01-19 RX ADMIN — ATOMOXETINE 25 MG: 25 CAPSULE ORAL at 09:59

## 2022-01-19 NOTE — PROGRESS NOTES
01/19/22 1120   Engagement   Intervention Group   Topic Detail OT: Education on physical wellness and social wellness with interactive physical wellness and social wellness activity (Theraband and chat pack) to increase physical wellness, social wellness, concentration, sequencing, attention to task/detail, coping with stress, symptom management, and overall wellness   Attendance Did not attend

## 2022-01-19 NOTE — PLAN OF CARE
Problem: Behavioral Health Plan of Care  Goal: Optimized Coping Skills in Response to Life Stressors  Outcome: Improving     Problem: Sleep Disturbance  Goal: Adequate Sleep/Rest  Outcome: Improving   Pt up for snack x1 and slept all night, no c/o pain, no significant event and no PRN.

## 2022-01-19 NOTE — PLAN OF CARE
Problem: Behavioral Health Plan of Care  Goal: Plan of Care Review  Outcome: Improving  Goal: Adheres to Safety Considerations for Self and Others  Outcome: Improving  Goal: Absence of New-Onset Illness or Injury  Outcome: Improving  Goal: Optimized Coping Skills in Response to Life Stressors  Outcome: Improving  Goal: Develops/Participates in Therapeutic [place] to Support Successful Transition  Outcome: Improving     Problem: Suicidal Behavior  Goal: Suicidal Behavior is Absent or Managed  Outcome: Improving     Problem: Sleep Disturbance  Goal: Adequate Sleep/Rest  Outcome: Improving   Pt has spent most of this shift in is room. Pt rates anxiety at 7/10 and denies all other psych symptoms. Pt had portable abdominal xray today. Pt has been medication compliant.

## 2022-01-19 NOTE — PLAN OF CARE
"Assessment/Intervention, Care Coordination and Current Symptoms:   [facility] met with patient. He reported his mood is \"good\" today and denied any SI, HI, or hallucinations. CTC discussed aftercare options with patient. He is agreeable to outpatient therapy and medication management. Patient reported completing a residential CD treatment program in the summer and would possibly be open to this again. [facility] will discuss these referrals with patient's mother as she is obtaining providers for patient in [place], [place] where they live. Patient denied any other questions or concerns for [facility] at this time.    [facility] spoke with patient's father, Mark [phone], to provide clinical update. [facility] discussed anticipated discharge for Friday 1/21/22. Mark plans to look into flights in the late afternoon or evening time. Denied any other questions or concerns at this time. NIRMAL in chart.     [facility] spoke with patient's mother, Bg [phone], to provide clinical update. [facility] discussed anticipated discharge for 1/21/22. [facility] also discussed importance of aftercare for patient who has agreed to psychiatry and therapy. Patient will be provided 30-day supply of medications upon discharge. Bg reports she and Mark are working on this and will establish appointments prior to 30-days. Denied any other questions or concerns at this time. NIRMAL in chart.         Discharge Plan or Goal:  Home with outpatient supports        Barriers to Discharge:  symptom stabilization, medication management, coordination of care        Referral Status:    None at this time     Patient's mother, Christ [phone], is scheduling appointments for patient with established providers in [place], [place].     Legal Status:  Voluntary     Linda Dickerson [facility], 1/19/2022, 2:38 PM      "

## 2022-01-19 NOTE — CONSULTS
[facility]  Consult Note - Hospitalist Service  Date of Admission:  1/14/2022  Consult Requested by: Cynthia Ge MD  Reason for Consult: Crohn's disease, vomiting    Assessment & Plan   Willy Flores is a 26 year old male admitted on 1/14/2022. He has a PMH of schizophrenia, ADHD, anxiety, anorexia, and narcolepsy. He stated that he has Crohn's colitis, but after reviewing the chart it seems that he was diagnosed with irritable bowel syndrome 3 years ago in [place]. At that time he went into the ER with 1 month of intermittent diarrhea with associated abdominal pain and vomiting, with intermittent constipation. 1/18, pt vomited once and zofran helped. Hospitalist was consulted and he was switched to a clear liquid diet and labs and an abd XR was ordered. Symptoms have since resolved. Pt would like to try a regular diet today. Pt also in with alcohol withdrawal which could have contributed to the nausea    #Irritable Bowel Syndrome  -Abdominal XR showed some stool a moderate to large amount of stool in the right colon with average amount of stool left colon. Normal bowel gas pattern. No obstruction. No free air. No evidence for renal stones.   -Pt had a bowel movement today  -Denies N/V, abdominal pain, fever, blood in stool, abdominal distension, gas, bloating  -Abdominal exam benign no guarding or rebound tenderness  - Advanced diet to regular and will follow to see how he is tolerating this  -stool softeners ordered prn  -Do not see a need to follow up with GI unless symptoms become more frequent and increase in severity  -encourage stress management and stress reducing therapies since stress can bring on IBS symptoms  -continue to use prn zofran for nausea      Yanni Bustillo NP  [facility]  Securely message with the Vocera Web Console (learn more here)  Text page via [facility] Paging/[facility]       Hospitalist Service    Clinically Significant Risk Factors  "Present on Admission                    ______________________________________________________________________    Chief Complaint   \" I want to eat regular food\"    History is obtained from the patient    History of Present Illness   Willy Flores is a 26 year old male who is being managed primarily by psych in mental health unit. Yesterday, patient had an episode of nausea with vomiting. Pt thought he had a history of Crohns, but past medical records state it was irritable bowel syndrome.    Review of Systems   CONSTITUTIONAL: NEGATIVE for fever, chills, change in weight  RESP: NEGATIVE for significant cough or SOB  CV: NEGATIVE for chest pain, palpitations or peripheral edema  GI: NEGATIVE for nausea, abdominal pain, heartburn, or change in bowel habits  NEURO: NEGATIVE for weakness, dizziness or paresthesias  PSYCHIATRIC: NEGATIVE for changes in mood    Past Medical History    I have reviewed this patient's medical history and updated it with pertinent information if needed.   No past medical history on file.    Past Surgical History   I have reviewed this patient's surgical history and updated it with pertinent information if needed.  No past surgical history on file.    Social History   I have reviewed this patient's social history and updated it with pertinent information if needed.  Social History     Tobacco Use     Smoking status: Not on file     Smokeless tobacco: Not on file   Substance Use Topics     Alcohol use: Not on file     Drug use: Not on file         Medications   I have reviewed this patient's current medications    Allergies   Allergies   Allergen Reactions     Risperidone Other (See Comments)     Unable to close mouth     Zyprexa [Olanzapine] Other (See Comments)     Unable to close mouth, drink liquids       Physical Exam   Vital Signs: Temp: 98.4  F (36.9  C)   BP: 130/82 Pulse: 90     SpO2: 97 % O2 Device: None (Room air)    Weight: 156 lbs 0 oz    Constitutional: awake, alert, cooperative, " no apparent distress, and appears stated age  Respiratory: No increased work of breathing, good air exchange, clear to auscultation bilaterally, no crackles or wheezing  Cardiovascular: Normal apical impulse, regular rate and rhythm, normal S1 and S2, no S3 or S4, and no murmur noted  GI: No scars, normal bowel sounds, soft, non-distended, non-tender, no masses palpated, no hepatosplenomegally  Neurologic: Awake, alert, oriented to name, place and time.  Cranial nerves II-XII are grossly intact.  Motor is 5 out of 5 bilaterally.  Cerebellar finger to nose, heel to shin intact.  Sensory is intact.  Babinski down going, Romberg negative, and gait is normal.  Neuropsychiatric: General: normal, calm and poor eye contact    Data   Results for orders placed or performed during the hospital encounter of 01/14/22 (from the past 24 hour(s))   UA reflex to Microscopic and Culture    Specimen: Urine, NOS   Result Value Ref Range    Color Urine Colorless Colorless, Straw, Light Yellow, Yellow    Appearance Urine Clear Clear    Glucose Urine Negative Negative mg/dL    Bilirubin Urine Negative Negative    Ketones Urine Negative Negative mg/dL    Specific Gravity Urine 1.003 1.001 - 1.030    Blood Urine Negative Negative    pH Urine 6.5 5.0 - 7.0    Protein Albumin Urine Negative Negative mg/dL    Urobilinogen Urine <2.0 <2.0 mg/dL    Nitrite Urine Negative Negative    Leukocyte Esterase Urine Negative Negative    Narrative    Microscopic not indicated   Lactic acid whole blood   Result Value Ref Range    Lactic Acid 1.6 0.7 - 2.0 mmol/L   Comprehensive metabolic panel   Result Value Ref Range    Sodium 142 136 - 145 mmol/L    Potassium 3.6 3.5 - 5.0 mmol/L    Chloride 106 98 - 107 mmol/L    Carbon Dioxide (CO2) 24 22 - 31 mmol/L    Anion Gap 12 5 - 18 mmol/L    Urea Nitrogen 12 8 - 22 mg/dL    Creatinine 0.76 0.70 - 1.30 mg/dL    Calcium 10.0 8.5 - 10.5 mg/dL    Glucose 57 (LL) 70 - 125 mg/dL    Alkaline Phosphatase 77 45 - 120  U/L    AST 14 0 - 40 U/L    ALT 23 0 - 45 U/L    Protein Total 7.9 6.0 - 8.0 g/dL    Albumin 4.1 3.5 - 5.0 g/dL    Bilirubin Total 0.3 0.0 - 1.0 mg/dL    GFR Estimate >90 >60 mL/min/1.73m2   Lipase   Result Value Ref Range    Lipase <9 0 - 52 U/L   CRP inflammation   Result Value Ref Range    CRP 1.0 (H) 0.0-<0.8 mg/dL   CBC with Platelets & Differential    Narrative    The following orders were created for panel order CBC with Platelets & Differential.  Procedure                               Abnormality         Status                     ---------                               -----------         ------                     CBC with platelets and d...[[id number]]  Abnormal            Final result                 Please view results for these tests on the individual orders.   CBC with platelets and differential   Result Value Ref Range    WBC Count 4.4 4.0 - 11.0 10e3/uL    RBC Count 4.66 4.40 - 5.90 10e6/uL    Hemoglobin 15.8 13.3 - 17.7 g/dL    Hematocrit 45.8 40.0 - 53.0 %    MCV 98 78 - 100 fL    MCH 33.9 (H) 26.5 - 33.0 pg    MCHC 34.5 31.5 - 36.5 g/dL    RDW 11.9 10.0 - 15.0 %    Platelet Count 214 150 - 450 10e3/uL    % Neutrophils 59 %    % Lymphocytes 24 %    % Monocytes 14 %    % Eosinophils 1 %    % Basophils 1 %    % Immature Granulocytes 1 %    NRBCs per 100 WBC 0 <1 /100    Absolute Neutrophils 2.7 1.6 - 8.3 10e3/uL    Absolute Lymphocytes 1.1 0.8 - 5.3 10e3/uL    Absolute Monocytes 0.6 0.0 - 1.3 10e3/uL    Absolute Eosinophils 0.0 0.0 - 0.7 10e3/uL    Absolute Basophils 0.0 0.0 - 0.2 10e3/uL    Absolute Immature Granulocytes 0.0 <=0.4 10e3/uL    Absolute NRBCs 0.0 10e3/uL   Glucose by meter   Result Value Ref Range    GLUCOSE BY METER POCT 123 (H) 70 - 99 mg/dL   XR Abdomen 2 Views    Narrative    EXAM: XR ABDOMEN 2VIEWS  LOCATION: [facility]  DATE/TIME: 1/19/2022 8:20 AM    INDICATION: hx: crohn's, alcohol abuse  COMPARISON: None.      Impression    IMPRESSION: Moderate to  large amount stool in the right colon with average amount of stool left colon. Normal bowel gas pattern. No obstruction. No free air. No evidence for renal stones.

## 2022-01-19 NOTE — PROGRESS NOTES
01/19/22 1315   Engagement   Intervention Group   Topic Detail OT: Interactive social activity (Processing Bingo) to increase concentration, focus, attention to task/detail, sharing information, reminiscing, insight development, and social engagement   Attendance Did not attend   Reason for Not Attending Refused     Did not attend after face to face invite.

## 2022-01-19 NOTE — PLAN OF CARE
Problem: Behavioral Health Plan of Care  Goal: Optimized Coping Skills in Response to Life Stressors  Outcome: Improving     Pt was calm and cooperative this shift, visible on the unit. A BT reported to this writer that patient was seen pulling his hair. This writer interviewed the pt who agreed to pulling his hair stating that he was bored and whenever he is bored, he pulls his hair. Pt also informed this writer that he loves to collect his hair. Pt denied pain and all psych symptoms, was medication compliant, no PRN given.

## 2022-01-20 ENCOUNTER — APPOINTMENT (OUTPATIENT)
Dept: INTERPRETER SERVICES | Facility: CLINIC | Age: 27
End: 2022-01-20

## 2022-01-20 PROCEDURE — 128N000001 HC R&B CD/MH ADULT

## 2022-01-20 PROCEDURE — 250N000013 HC RX MED GY IP 250 OP 250 PS 637: Performed by: PSYCHIATRY & NEUROLOGY

## 2022-01-20 PROCEDURE — 99232 SBSQ HOSP IP/OBS MODERATE 35: CPT | Performed by: PSYCHIATRY & NEUROLOGY

## 2022-01-20 PROCEDURE — 99231 SBSQ HOSP IP/OBS SF/LOW 25: CPT

## 2022-01-20 RX ADMIN — GABAPENTIN 300 MG: 300 CAPSULE ORAL at 20:19

## 2022-01-20 RX ADMIN — MULTIPLE VITAMINS W/ MINERALS TAB 1 TABLET: TAB at 08:14

## 2022-01-20 RX ADMIN — PANTOPRAZOLE SODIUM 40 MG: 40 TABLET, DELAYED RELEASE ORAL at 20:19

## 2022-01-20 RX ADMIN — FOLIC ACID 1 MG: 1 TABLET ORAL at 08:14

## 2022-01-20 RX ADMIN — Medication 3 MG: at 20:19

## 2022-01-20 RX ADMIN — HYDROXYZINE HYDROCHLORIDE 50 MG: 25 TABLET ORAL at 08:25

## 2022-01-20 RX ADMIN — ATOMOXETINE 25 MG: 25 CAPSULE ORAL at 08:14

## 2022-01-20 RX ADMIN — QUETIAPINE FUMARATE 400 MG: 200 TABLET ORAL at 20:19

## 2022-01-20 RX ADMIN — PRAZOSIN HYDROCHLORIDE 2 MG: 1 CAPSULE ORAL at 20:19

## 2022-01-20 RX ADMIN — GABAPENTIN 300 MG: 300 CAPSULE ORAL at 14:31

## 2022-01-20 RX ADMIN — NALTREXONE HYDROCHLORIDE 50 MG: 50 TABLET, FILM COATED ORAL at 08:14

## 2022-01-20 RX ADMIN — GABAPENTIN 300 MG: 300 CAPSULE ORAL at 08:14

## 2022-01-20 RX ADMIN — HYDROXYZINE HYDROCHLORIDE 50 MG: 25 TABLET ORAL at 16:05

## 2022-01-20 RX ADMIN — Medication 100 MG: at 08:14

## 2022-01-20 RX ADMIN — PANTOPRAZOLE SODIUM 40 MG: 40 TABLET, DELAYED RELEASE ORAL at 08:14

## 2022-01-20 ASSESSMENT — ACTIVITIES OF DAILY LIVING (ADL)
HYGIENE/GROOMING: INDEPENDENT
ORAL_HYGIENE: INDEPENDENT
DRESS: INDEPENDENT

## 2022-01-20 NOTE — PROGRESS NOTES
Pt. Has been out on the unit most of the evening. He sits by himself and does not socialize with peers much. He was medication compliant and he did shower this evening.

## 2022-01-20 NOTE — PLAN OF CARE
Problem: Behavioral Health Plan of Care  Goal: Plan of Care Review  Outcome: No Change     Problem: Sleep Disturbance  Goal: Adequate Sleep/Rest  Outcome: No Change     Problem: Sleep Disturbance  Goal: Adequate Sleep/Rest  Outcome: No Change    Pt. Appeared to sleep all shift except he was up one time aroud 0400. He reported feeling nausea. Vs were taken /79 P 82 T 97.8 CIWA score of 6. He did not want and prn medication. He had a glass of cold water and went back to bed.     Problem: Suicidal Behavior  Goal: Suicidal Behavior is Absent or Managed  Outcome: Improving   Denies any feelings of SI.

## 2022-01-20 NOTE — PROGRESS NOTES
PSYCHIATRY PROGRESS NOTE         DATE OF SERVICE:   1/19/2022         CHIEF COMPLAINT:     paranoia ,cravings for alcohol, medication adjustment           OBJECTIVE:     Nursing reports : Patient takes medications, starting spending some time in the [facility]    ocial services reports working on outpatient referrals    Registered dietitian consult Rosita Boss, NERIS on 1/17/2022  Recommended to continue regular diet no diagnosis of malnutrition    Hospitalist consult by Aaron White APRN CNP on 1/18/2022  hospital medicine service for nausea and vomiting in setting of crohn's and alcohol abuse.  Labs and imaging ordered and will be followed up.   Ondansetron already on MAR. So far Zofran has been utilized once on 01/17. Placed on clear liquid diet.    Hospitalist consult by Selma Liao NPintermittent diarrhea with associated abdominal pain and vomiting, with intermittent constipation. 1/18, pt vomited once and zofran helped. Hospitalist was consulted and he was switched to a clear liquid diet and labs and an abd XR was ordered. Symptoms have since resolved. Pt would like to try a regular diet today. Pt also in with alcohol withdrawal which could have contributed to the nausea   #Irritable Bowel Syndrome  -Abdominal XR showed some stool a moderate to large amount of stool in the right colon with average amount of stool left colon. Normal bowel gas pattern. No obstruction. No free air. No evidence for renal stones.   -Pt had a bowel movement today  -Denies N/V, abdominal pain, fever, blood in stool, abdominal distension, gas, bloating  -Abdominal exam benign no guarding or rebound tenderness  - Advanced diet to regular and will follow to see how he is tolerating this  -stool softeners ordered prn  -Do not see a need to follow up with GI unless symptoms become more frequent and increase in severity  -encourage stress management and stress reducing therapies since stress can bring on IBS symptoms  -continue  to use prn zofran for nausea.         SUBJECTIVE:      Willy says that he still has paranoia. He thinks that people are after him, but he does not want to fight or hurt anyone.No urges to run away.  He denies hallucination, and he denies suicidal and homicidal ideas. He says today that he doesn't want Zoloft, because he doesn't feel depressed for a long time. He says that he may have one or two depressed days here and there, but he says that his major symptoms are delusions and hallucinations and alcoholism. He says that he spends some time in the lounge and attended a few groups. He says he's anxious. He says he has low energy and concentration. He says that when he goes to [place] he will have to go to chemical dependency treatment which is court ordered, but he says there is waiting time.  talked with his mother who went to [place] on Sunday. She told  that Willy is on the waiting list for residential chemical dependency treatment in [place] and it is court ordered.  says that his mother scheduled appointment with psychiatrist in a month. He needs to be seen in seven to ten days.  will ask his mother to schedule appointment with primary care physician if he cannot see psychiatrist earlier. He is also on Naltrexone and physician needs to monitor liver enzymes. He says that he has cravings for alcohol. Hopefully Naltrexone will decrease cravings. We discussed dangerousness of drinking while on Naltrexone. He would not have effect of alcohol on Naltrexone and if he continues drinking he could have fatal alcohol poisoning. He was seen by hospitalist for IBS.She did not mention  diagnosed with Chron's disease.He had abdominal xray and it showed stool.No more investigation. Will see his doctor in [place].He wants regular diet and it was ordered .           MEDICATIONS:       atomoxetine  25 mg Oral Daily     folic acid  1 mg Oral Daily     gabapentin  300 mg Oral TID      multivitamin, therapeutic  1 tablet Oral Daily     naltrexone  50 mg Oral Daily     omeprazole  40 mg Oral Daily     prazosin  2 mg Oral At Bedtime     QUEtiapine  300 mg Oral At Bedtime     sertraline  50 mg Oral Daily     thiamine  100 mg Oral Daily     acetaminophen, alum & mag hydroxide-simethicone, docusate sodium, hydrOXYzine, melatonin, ondansetron, traZODone    Medication adherence: Yes  Medication side effects: No  Benefit: Symptom reduction         ROS:   As per history of present illness, otherwise reminder of review of systems is negative for: General, eyes, ears, nose, throat, neck, respiratory, cardiovascular, gastrointestinal, genitourinary, meniscal skeletal, neurological, hematological, dermatological and endocrine system.         MENTAL STATUS EXAM:   BP (!) 144/93   Pulse 112   Temp 98.4  F (36.9  C)   Resp 20   Wt 70.8 kg (156 lb)   SpO2 97%   BMI 25.18 kg/m      Appearance:fair hygiene  Orientation:x3  Speech:fluent  Language ability: normal syntax and vocabulary  Thought process: concrete  Thought content: positive for paranoid  delusions , denies  hallucinations  Associations: Connected  Suicidal Ideation: denies   Homicidal Ideation:denies   Mood: denies depression, says fine    Affect: irritable   Intellectual functioning:average  Fund of Knowledge: consistent with education and experience   Attention/Concentration: decreased  Memory: intact  Psychomotor Behavior: calm  Muscle Strength and Tone: no atrophy or involuntary movement  Gait and Station: steady  Insight and judgement:fair in terms of that he wants help         LABS:   personally reviewed.     Urine toxicology screen negative  COVID-19 test negative  Alcohol level 0.270  Salicylate and acetaminophen level negative  Fasting glucose 102  Potassium 3.6  Creatinine 0.75  ALT 15  AST 10  Alkaline phosphatase 66    Lab Results   Component Value Date     01/14/2022     01/13/2022    CO2 23 01/14/2022    CO2 22 01/13/2022     BUN 6 01/14/2022    BUN 6 01/13/2022       Lab Results   Component Value Date    CHOL 184 01/17/2022    TRIG 159 01/17/2022    HDL 39 01/17/2022       No results found for this or any previous visit (from the past 24 hour(s)).    Ref. Range 1/14/2022 11:26 1/17/2022 08:45 1/17/2022 18:48   Sodium Latest Ref Range: 136 - 145 mmol/L 137     Potassium Latest Ref Range: 3.5 - 5.0 mmol/L 3.6     Chloride Latest Ref Range: 98 - 107 mmol/L 103     Carbon Dioxide Latest Ref Range: 22 - 31 mmol/L 23     Urea Nitrogen Latest Ref Range: 8 - 22 mg/dL 6 (L)     Creatinine Latest Ref Range: 0.70 - 1.30 mg/dL 0.75     GFR Estimate Latest Ref Range: >60 mL/min/1.73m2 >90     Calcium Latest Ref Range: 8.5 - 10.5 mg/dL 9.5     Anion Gap Latest Ref Range: 5 - 18 mmol/L 11     Phosphorus Latest Ref Range: 2.5 - 4.5 mg/dL 3.0     Albumin Latest Ref Range: 3.5 - 5.0 g/dL 4.4 3.6    Protein Total Latest Ref Range: 6.0 - 8.0 g/dL  6.5    Bilirubin Total Latest Ref Range: 0.0 - 1.0 mg/dL  0.3    Alkaline Phosphatase Latest Ref Range: 45 - 120 U/L  66    ALT Latest Ref Range: 0 - 45 U/L  15    AST Latest Ref Range: 0 - 40 U/L  10    Bilirubin Direct Latest Ref Range: <=0.5 mg/dL  <0.1    Cholesterol Latest Ref Range: <=199 mg/dL  184    HDL Cholesterol Latest Ref Range: >=40 mg/dL  39 (L)    LDL Cholesterol Calculated Latest Ref Range: <=129 mg/dL  113    Triglycerides Latest Ref Range: <=149 mg/dL  159 (H)    Glucose Latest Ref Range: 70 - 125 mg/dL 99 102    SARS CoV2 PCR Latest Ref Range: Negative    Negative         DIAGNOSIS:     Schizophrenia, schizoaffective disorder chronic with acute exacerbation  Posttraumatic stress disorder  ADHD inattentive type  Alcohol use disorder severe dependency  Insomnia due to other mental    Patient Active Problem List   Diagnosis     Suicidal ideation     Noncompliance with medication regimen     History of psychiatric disorder     Ingestion of substance, intentional self-harm, initial encounter (H)      Mood disorder (H)     Schizophrenia, schizoaffective, chronic with acute exacerbation (H)     Alcohol use disorder, severe, dependence (H)     Insomnia due to other mental disorder     ADHD (attention deficit hyperactivity disorder), inattentive type     PTSD (post-traumatic stress disorder)          PLAN:   Patient and I discussed diagnosis and treatment plan.  He agrees to stay on the voluntary basis.  He is from [place].  He will go back to [place] after mental health stabilization in the hospital.  He agrees with the following recommendations:    Medications  Increase Seroquel 400 mg at bedtime for paranoia, mood and anxiety  Discontinue Zoloft   Naltrexone 50 mg daily for alcohol, liver enzymes are normal  Strattera 25 mg daily for ADHD  Prazosin 2 mg nightly for nightmares of PTSD  Neurontin 300 mg 3 times daily for anxiety  Discontinue Depakote due to risk for liver when drinking   Vistaril 50 mg every 6 hours as needed for anxiety  Trazodone 100 mg at at bedtime for sleep  We discussed side effects, benefits and alternative treatments and patient agrees .  Rule 25 for chemical dependency treatment   will collect collateral information and make outpatient referrals  Staff to provide emotional support and redirect as needed  Patient encouraged to attend groups  Lab results: Reviewed personally  Consultation: Hospitalist consult completed    Risk Assessment: Paranoia, alcoholism    Coordination of Care:   Patient seen, medical record reviewed, care coordinated with the team.    Total time:  More than 35 minutes spent on this visit with more than 50% time  spent on coordination of care with staff,  educating patient about treatment options, side effects and benefits and alternative treatments for medications, providing supportive therapy regarding above symptoms.    This document is created with the help of Dragon dictation system.  All grammatical/typing errors or context distortion are  unintentional and inherent to software.    Cynthia Ge MD        Re-Certification I certify that the inpatient psychiatric facility services furnished since the previous certification were, and continue to be, medically necessary for, either, treatment which could reasonably be expected to improve the patient s condition or diagnostic study and that the hospital records indicate that the services furnished were, either, intensive treatment services, admission and related services necessary for diagnostic study, or equivalent services.     I certify that the patient continues to need, on a daily basis, active treatment furnished directly by or requiring the supervision of inpatient psychiatric facility personnel.   I estimate TBD days of hospitalization is necessary for proper treatment of the patient. My plans for post-hospital care for this patient are : Medications, appointments     Cynthia Ge MD

## 2022-01-20 NOTE — PLAN OF CARE
Problem: Behavioral Health Plan of Care  Goal: Plan of Care Review  Outcome: Improving  Goal: Adheres to Safety Considerations for Self and Others  Outcome: Improving  Goal: Absence of New-Onset Illness or Injury  Outcome: Improving  Goal: Optimized Coping Skills in Response to Life Stressors  Outcome: Improving  Goal: Develops/Participates in Therapeutic [place] to Support Successful Transition  Outcome: Improving     Problem: Suicidal Behavior  Goal: Suicidal Behavior is Absent or Managed  Outcome: Improving     Problem: Sleep Disturbance  Goal: Adequate Sleep/Rest  Outcome: Improving   Pt hs been up on the unit part of this shift. Pt has been medication compliant. Pt rates anxiety at 7/10 and denies all other psych symptoms.

## 2022-01-20 NOTE — PROGRESS NOTES
Pt joined group with only about 7 minutes left.  Pt worked quickly in a somewhat messy manner with limited attention to detail.       01/20/22 1433   Engagement   Intervention Group   Topic Detail OT: Wellness group (Chair yoga) to promote physical wellness, relaxation, insight development, pain management and healthy leisure for increased management of mental health symptoms.   Attendance Attended   Patient Response Demonstrated understanding of materials provided;Was respectful   Concentrated on Task 5 - 10 min   Cognition Goal-directed   Mood/Affect Pleasant   Social/Behavioral Engaged

## 2022-01-20 NOTE — PROGRESS NOTES
[facility]    Medicine Progress Note - Hospitalist Service    Date of Admission:  1/14/2022    Assessment & Plan        Willy Flores is a 26 year old male admitted on 1/14/2022. He has a PMH of schizophrenia, ADHD, anxiety, anorexia, and narcolepsy. He stated that he has Crohn's colitis, but after reviewing the chart it seems that he was diagnosed with irritable bowel syndrome 3 years ago in [place]. At that time he went into the ER with 1 month of intermittent diarrhea with associated abdominal pain and vomiting, with intermittent constipation. 1/18, pt vomited once and zofran helped. Hospitalist was consulted and he was switched to a clear liquid diet and labs and an abd XR was ordered.    #Irritable Bowel Syndrome  -Abdominal XR showed some stool a moderate to large amount of stool in the right colon with average amount of stool left colon. Normal bowel gas pattern. No obstruction. No free air. No evidence for renal stones.   -Pt had a bowel movement today and yesterday  -Denies N/V, abdominal pain, fever, blood in stool, abdominal distension, gas, bloating  -Abdominal exam benign no guarding or rebound tenderness  - Advanced diet to regular and and pt has been tolerating well   -stool softeners ordered prn  -Do not see a need to follow up with GI unless symptoms become more frequent and increase in severity  -encourage stress management and stress reducing therapies since stress can bring on IBS symptoms  -continue to use prn zofran for nausea    HMS will sign off. Please contact us for any concerns or needs.                Diet: Regular Diet Adult    DVT Prophylaxis: Low Risk/Ambulatory with no VTE prophylaxis indicated  Seo Catheter: Not present  Central Lines: None  Cardiac Monitoring: None  Code Status: Full Code      Disposition Plan   Expected Discharge: TBD  Anticipated discharge location:  Awaiting care coordination huddle  Delays:    per psychiatry        The patient's care  was discussed with the Bedside Nurse.    Yanni Bustillo NP  Hospitalist Service  [facility]  Securely message with the [employer] Web Console (learn more here)  Text page via [employer] Paging/Directory         Clinically Significant Risk Factors Present on Admission                    ______________________________________________________________________    Interval History   No acute events overnight or this morning.     Data reviewed today: I reviewed all medications, new labs and imaging results over the last 24 hours    .Physical Exam   Vital Signs: Temp: 97.5  F (36.4  C) Temp src: Oral BP: (!) 144/93 Pulse: 112   Resp: 16 SpO2: 99 % O2 Device: None (Room air)    Weight: 156 lbs 0 oz

## 2022-01-20 NOTE — PLAN OF CARE
Assessment/Intervention, Care Coordination and Current Symptoms:   [facility] consulted with psychiatrist and met with patient. He was laying down in his bed upon approach, but willing to meet. Patient reported he did not sleep well last night due to the noise level on the unit. He plans to take a nap today. Rated current anxiety 6/10 and denied any other psych symptoms. Denies SI/HI. [facility] provided psychoeducation on the importance of medication compliance after discharge. Patient confirmed his understanding and reported he will take medications as prescribed. Denied any other questions or concerns at this time.      [facility] spoke with patient's father, Mark [phone], to provide an update. He reports booking patient a discharge flight home for tomorrow 1/21/22 at 3:47PM on [employer] out of terminal 1 at [facility]. Mark requested patient's cell phone be charged prior to his discharge. NIRMAL in chart.     [facility] spoke with patient's mother, Bg [phone], to provide an update. Bg plans to review patient's medications with him once he returns home and help administer these to him to support compliance. NIRMAL in chart.      Discharge Plan or Goal:  Home with outpatient supports        Barriers to Discharge:  symptom stabilization, medication management, coordination of care        Referral Status:    None at this time     Patient's parents (Bg and Mark) are scheduling outpatient appointments for patient with psychiatry and therapy in [place], [place] where they live. They report patient also has an established PCP appointment scheduled for 1/27/22.     Legal Status:  Voluntary     Linda Dickerson, [facility], 1/20/2022, 2:54 PM

## 2022-01-20 NOTE — PLAN OF CARE
Problem: Behavioral Health Plan of Care  Goal: Patient-Specific Goal (Individualization)  Outcome: Improving  Flowsheets  Taken 1/17/2022 1036 by Paulina Abdullahi MSW  Patient-Specific Goals (Include Timeframe): Patient's goal is to return to [place] upon discharge and continue to stabilize in the community.  Taken 1/16/2022 1238 by Lucia Robles, RN  Patient Vulnerabilities:   history of unsuccessful treatment   substance abuse/addiction  Taken 1/15/2022 1051 by Lucia Robles, RN  Patient Personal Strengths: expressive of needs     BEHAVIORAL TEAM DISCUSSION    Participants: Nurse- Win CHAVEZ, Provider- [facility], Pharmacy- [facility], Occupational Therapy- , CTC: Es KRAMER  Progress: Patient is currently voluntary and is progressing towards discharge home  Anticipated length of stay: Discharge tomorrow, 1/21  Continued Stay Criteria/Rationale: Discharge planning  Medical/Physical: CIWA and elevated blood pressure  Precautions:   Behavioral Orders   Procedures    Code 1 - Restrict to Unit    Routine Programming     As clinically indicated    Status 15     Every 15 minutes.    Suicide precautions     Patients on Suicide Precautions should have a Combination Diet ordered that includes a Diet selection(s) AND a Behavioral Tray selection for Safe Tray - with utensils, or Safe Tray - NO utensils       Plan: Discharge to airport, return home to [place] and follow up with support of family  Rationale for change in precautions or plan: N/A

## 2022-01-20 NOTE — PROGRESS NOTES
01/20/22 1038   Engagement   Intervention Group   Topic Detail OT: Wellness group (Chair yoga) to promote physical wellness, relaxation, insight development, pain management and healthy leisure for increased management of mental health symptoms.   Attendance Did not attend

## 2022-01-21 ENCOUNTER — APPOINTMENT (OUTPATIENT)
Dept: INTERPRETER SERVICES | Facility: CLINIC | Age: 27
End: 2022-01-21

## 2022-01-21 VITALS
SYSTOLIC BLOOD PRESSURE: 121 MMHG | DIASTOLIC BLOOD PRESSURE: 76 MMHG | TEMPERATURE: 97.6 F | WEIGHT: 156 LBS | HEART RATE: 88 BPM | RESPIRATION RATE: 18 BRPM | BODY MASS INDEX: 25.18 KG/M2 | OXYGEN SATURATION: 97 %

## 2022-01-21 PROCEDURE — 99239 HOSP IP/OBS DSCHRG MGMT >30: CPT | Performed by: PSYCHIATRY & NEUROLOGY

## 2022-01-21 PROCEDURE — 250N000013 HC RX MED GY IP 250 OP 250 PS 637: Performed by: PSYCHIATRY & NEUROLOGY

## 2022-01-21 RX ORDER — QUETIAPINE FUMARATE 400 MG/1
400 TABLET, FILM COATED ORAL AT BEDTIME
Qty: 30 TABLET | Refills: 0 | Status: SHIPPED | OUTPATIENT
Start: 2022-01-21

## 2022-01-21 RX ORDER — PRAZOSIN HYDROCHLORIDE 2 MG/1
2 CAPSULE ORAL AT BEDTIME
Qty: 30 CAPSULE | Refills: 0 | Status: SHIPPED | OUTPATIENT
Start: 2022-01-21

## 2022-01-21 RX ORDER — TRAZODONE HYDROCHLORIDE 100 MG/1
100 TABLET ORAL
Qty: 30 TABLET | Refills: 0 | Status: SHIPPED | OUTPATIENT
Start: 2022-01-21

## 2022-01-21 RX ORDER — FOLIC ACID 1 MG/1
1 TABLET ORAL DAILY
Qty: 30 TABLET | Refills: 0 | Status: SHIPPED | OUTPATIENT
Start: 2022-01-21

## 2022-01-21 RX ORDER — NALTREXONE HYDROCHLORIDE 50 MG/1
50 TABLET, FILM COATED ORAL DAILY
Qty: 30 TABLET | Refills: 0 | Status: SHIPPED | OUTPATIENT
Start: 2022-01-21

## 2022-01-21 RX ORDER — LANOLIN ALCOHOL/MO/W.PET/CERES
3 CREAM (GRAM) TOPICAL
Qty: 30 TABLET | Refills: 0 | Status: SHIPPED | OUTPATIENT
Start: 2022-01-21

## 2022-01-21 RX ORDER — GABAPENTIN 300 MG/1
300 CAPSULE ORAL 3 TIMES DAILY
Qty: 90 CAPSULE | Refills: 0 | Status: SHIPPED | OUTPATIENT
Start: 2022-01-21

## 2022-01-21 RX ORDER — HYDROXYZINE HYDROCHLORIDE 50 MG/1
50 TABLET, FILM COATED ORAL EVERY 6 HOURS PRN
Qty: 30 TABLET | Refills: 0 | Status: SHIPPED | OUTPATIENT
Start: 2022-01-21

## 2022-01-21 RX ORDER — LANOLIN ALCOHOL/MO/W.PET/CERES
100 CREAM (GRAM) TOPICAL DAILY
Qty: 30 TABLET | Refills: 0 | Status: SHIPPED | OUTPATIENT
Start: 2022-01-21

## 2022-01-21 RX ORDER — ACETAMINOPHEN 500 MG
1000 TABLET ORAL EVERY 8 HOURS PRN
Qty: 30 TABLET | Refills: 0 | Status: SHIPPED | OUTPATIENT
Start: 2022-01-21

## 2022-01-21 RX ORDER — ATOMOXETINE 25 MG/1
25 CAPSULE ORAL DAILY
Qty: 30 CAPSULE | Refills: 0 | Status: SHIPPED | OUTPATIENT
Start: 2022-01-21

## 2022-01-21 RX ORDER — DOCUSATE SODIUM 100 MG/1
100 CAPSULE, LIQUID FILLED ORAL DAILY PRN
Qty: 30 CAPSULE | Refills: 0 | Status: SHIPPED | OUTPATIENT
Start: 2022-01-21

## 2022-01-21 RX ORDER — MULTIVITAMIN,THERAPEUTIC
1 TABLET ORAL DAILY
Qty: 30 TABLET | Refills: 0 | Status: SHIPPED | OUTPATIENT
Start: 2022-01-21

## 2022-01-21 RX ORDER — PANTOPRAZOLE SODIUM 40 MG/1
40 TABLET, DELAYED RELEASE ORAL 2 TIMES DAILY
Qty: 60 TABLET | Refills: 0 | Status: SHIPPED | OUTPATIENT
Start: 2022-01-21

## 2022-01-21 RX ADMIN — Medication 100 MG: at 08:37

## 2022-01-21 RX ADMIN — GABAPENTIN 300 MG: 300 CAPSULE ORAL at 08:36

## 2022-01-21 RX ADMIN — FOLIC ACID 1 MG: 1 TABLET ORAL at 08:36

## 2022-01-21 RX ADMIN — MULTIPLE VITAMINS W/ MINERALS TAB 1 TABLET: TAB at 08:36

## 2022-01-21 RX ADMIN — NALTREXONE HYDROCHLORIDE 50 MG: 50 TABLET, FILM COATED ORAL at 08:36

## 2022-01-21 RX ADMIN — PANTOPRAZOLE SODIUM 40 MG: 40 TABLET, DELAYED RELEASE ORAL at 08:36

## 2022-01-21 RX ADMIN — ATOMOXETINE 25 MG: 25 CAPSULE ORAL at 08:36

## 2022-01-21 NOTE — PLAN OF CARE
Occupational Therapy Discharge Note    Patient Name:  Willy Flores    D: Refer to daily doc flowsheets for details of progress toward goals.  A: Improvement seen in engagement in daily tasks.   P: Patient discharging to Home with outpatient supports. Discontinue OT Care Plan goals and interventions.  Conner Juarez, OTR    Date: 1/21/2022  Time: 12:55 PM    Problem: Relapse Prevention  Goal: Engage in OT Group  Description: Engage in 3 OT group activities that support recovery  Outcome: Completed

## 2022-01-21 NOTE — PROGRESS NOTES
01/21/22 1048   Engagement   Intervention Group   Topic Detail OT: Sensory motor dice wellness group for vestibular, proprioceptive, and deep pressure sensory input for grounding, relation, and coping with sx through distraction.   Attendance Attended   Patient Response Demonstrated understanding of materials provided;Prosocial behavior;Improved mood in response to group   Concentrated on Task 5 - 10 min   Cognition Follows through with task;Distractible;Restless   Mood/Affect Content;Pleasant   Social/Behavioral Cooperative;Engaged   Thought Content Reality oriented   Goals addressed in session today Patient joined for the last 10 minutes of group. He was cooperative and engaged appropriately.

## 2022-01-21 NOTE — PROGRESS NOTES
PSYCHIATRY PROGRESS NOTE         DATE OF SERVICE:   1/20/2022         CHIEF COMPLAINT:     He has better day today, looking forward to going to [place] tomorrow          OBJECTIVE:     Nursing reports : Patient takes medications, [facility]    ocial services reports working on outpatient referrals    Registered dietitian consult Rosita Boss, [facility]ERIS on 1/17/2022  Recommended to continue regular diet no diagnosis of malnutrition    Hospitalist consult by Aaron White APRN CNP on 1/18/2022  hospital medicine service for nausea and vomiting in setting of crohn's and alcohol abuse.  Labs and imaging ordered and will be followed up.   Ondansetron already on MAR. So far Zofran has been utilized once on 01/17. Placed on clear liquid diet.    Hospitalist consult by Selma Liao NPintermittent diarrhea with associated abdominal pain and vomiting, with intermittent constipation. 1/18, pt vomited once and zofran helped. Hospitalist was consulted and he was switched to a clear liquid diet and labs and an abd XR was ordered. Symptoms have since resolved. Pt would like to try a regular diet today. Pt also in with alcohol withdrawal which could have contributed to the nausea   #Irritable Bowel Syndrome  -Abdominal XR showed some stool a moderate to large amount of stool in the right colon with average amount of stool left colon. Normal bowel gas pattern. No obstruction. No free air. No evidence for renal stones.   -Pt had a bowel movement today  -Denies N/V, abdominal pain, fever, blood in stool, abdominal distension, gas, bloating  -Abdominal exam benign no guarding or rebound tenderness  - Advanced diet to regular and will follow to see how he is tolerating this  -stool softeners ordered prn  -Do not see a need to follow up with GI unless symptoms become more frequent and increase in severity  -encourage stress management and stress reducing therapies since stress can bring on IBS  symptoms  -continue to use prn zofran for nausea.         SUBJECTIVE:      Willy says he has better day today.  He spends more time out of the room. He has more energy. Concentration is better. He went to a few groups.  He is looking forward to going back home to [place] tomorrow.  He denies suicidal and homicidal ideations.  He denies hallucinations.  He says paranoia is still present, but improving on Seroquel.  He says he has less cravings for alcohol.  He says about 30%.  I discussed the importance of not drinking on naltrexone because it will not give him alcohol affect and if he continues to drink hoping he will get the fact, he can get alcohol poisoning and diet.  He denies side effects of medications.  He is not on any controlled substance.  He thinks that prazosin helps with nightmares, and Strattera helps with concentration.  He is motivated to take medications.  He will go to residential chemical dependency treatment in [place].  He is on the waiting list.   talked with his mother.           MEDICATIONS:       atomoxetine  25 mg Oral Daily     folic acid  1 mg Oral Daily     gabapentin  300 mg Oral TID     multivitamin, therapeutic  1 tablet Oral Daily     naltrexone  50 mg Oral Daily     pantoprazole  40 mg Oral BID     prazosin  2 mg Oral At Bedtime     QUEtiapine  400 mg Oral At Bedtime     thiamine  100 mg Oral Daily     acetaminophen, alum & mag hydroxide-simethicone, docusate sodium, hydrOXYzine, melatonin, ondansetron, traZODone    Medication adherence: Yes  Medication side effects: No  Benefit: Symptom reduction         ROS:   As per history of present illness, otherwise reminder of review of systems is negative for: General, eyes, ears, nose, throat, neck, respiratory, cardiovascular, gastrointestinal, genitourinary, meniscal skeletal, neurological, hematological, dermatological and endocrine system.         MENTAL STATUS EXAM:   BP (!) 136/92 (Patient Position: Sitting)   Pulse 77    Temp 97.7  F (36.5  C) (Oral)   Resp 16   Wt 70.8 kg (156 lb)   SpO2 98%   BMI 25.18 kg/m      Appearance:fair hygiene  Orientation:x3  Speech:fluent  Language ability: normal syntax and vocabulary  Thought process: concrete  Thought content: positive for paranoid  delusions , denies  hallucinations  Associations: Connected  Suicidal Ideation: denies   Homicidal Ideation:denies   Mood: denies depression, says fine    Affect: irritable   Intellectual functioning:average  Fund of Knowledge: consistent with education and experience   Attention/Concentration: decreased  Memory: intact  Psychomotor Behavior: calm  Muscle Strength and Tone: no atrophy or involuntary movement  Gait and Station: steady  Insight and judgement:fair in terms of that he wants help         LABS:   personally reviewed.     Urine toxicology screen negative  COVID-19 test negative  Alcohol level 0.270  Salicylate and acetaminophen level negative  Fasting glucose 102  Potassium 3.6  Creatinine 0.75  ALT 15  AST 10  Alkaline phosphatase 66    Lab Results   Component Value Date     01/14/2022     01/13/2022    CO2 23 01/14/2022    CO2 22 01/13/2022    BUN 6 01/14/2022    BUN 6 01/13/2022       Lab Results   Component Value Date    CHOL 184 01/17/2022    TRIG 159 01/17/2022    HDL 39 01/17/2022       No results found for this or any previous visit (from the past 24 hour(s)).    Ref. Range 1/14/2022 11:26 1/17/2022 08:45 1/17/2022 18:48   Sodium Latest Ref Range: 136 - 145 mmol/L 137     Potassium Latest Ref Range: 3.5 - 5.0 mmol/L 3.6     Chloride Latest Ref Range: 98 - 107 mmol/L 103     Carbon Dioxide Latest Ref Range: 22 - 31 mmol/L 23     Urea Nitrogen Latest Ref Range: 8 - 22 mg/dL 6 (L)     Creatinine Latest Ref Range: 0.70 - 1.30 mg/dL 0.75     GFR Estimate Latest Ref Range: >60 mL/min/1.73m2 >90     Calcium Latest Ref Range: 8.5 - 10.5 mg/dL 9.5     Anion Gap Latest Ref Range: 5 - 18 mmol/L 11     Phosphorus Latest Ref Range:  2.5 - 4.5 mg/dL 3.0     Albumin Latest Ref Range: 3.5 - 5.0 g/dL 4.4 3.6    Protein Total Latest Ref Range: 6.0 - 8.0 g/dL  6.5    Bilirubin Total Latest Ref Range: 0.0 - 1.0 mg/dL  0.3    Alkaline Phosphatase Latest Ref Range: 45 - 120 U/L  66    ALT Latest Ref Range: 0 - 45 U/L  15    AST Latest Ref Range: 0 - 40 U/L  10    Bilirubin Direct Latest Ref Range: <=0.5 mg/dL  <0.1    Cholesterol Latest Ref Range: <=199 mg/dL  184    HDL Cholesterol Latest Ref Range: >=40 mg/dL  39 (L)    LDL Cholesterol Calculated Latest Ref Range: <=129 mg/dL  113    Triglycerides Latest Ref Range: <=149 mg/dL  159 (H)    Glucose Latest Ref Range: 70 - 125 mg/dL 99 102    SARS CoV2 PCR Latest Ref Range: Negative    Negative         DIAGNOSIS:     Schizophrenia, schizoaffective disorder chronic with acute exacerbation  Posttraumatic stress disorder  ADHD inattentive type  Alcohol use disorder severe dependency  Insomnia due to other mental    Patient Active Problem List   Diagnosis     Suicidal ideation     Noncompliance with medication regimen     History of psychiatric disorder     Ingestion of substance, intentional self-harm, initial encounter (H)     Mood disorder (H)     Schizophrenia, schizoaffective, chronic with acute exacerbation (H)     Alcohol use disorder, severe, dependence (H)     Insomnia due to other mental disorder     ADHD (attention deficit hyperactivity disorder), inattentive type     PTSD (post-traumatic stress disorder)          PLAN:   Patient and I discussed diagnosis and treatment plan.  He is improving. He is from [place].  He will go back to [place] after mental health stabilization in the hospital.  He agrees with the following recommendations:    Medications  Seroquel 400 mg at bedtime for paranoia, mood and anxiety  Naltrexone 50 mg daily for alcohol, liver enzymes are normal  Strattera 25 mg daily for ADHD  Prazosin 2 mg nightly for nightmares of PTSD  Neurontin 300 mg 3 times daily for anxiety  Vistaril 50  mg every 6 hours as needed for anxiety  Trazodone 100 mg at at bedtime for sleep  We discussed side effects, benefits and alternative treatments and patient agrees .  Rule 25 for chemical dependency treatment   will collect collateral information and make outpatient referrals  Staff to provide emotional support and redirect as needed  Patient encouraged to attend groups  Lab results: Reviewed personally  Consultation: Hospitalist consult completed    Risk Assessment: Paranoia, alcoholism    Coordination of Care:   Patient seen, medical record reviewed, care coordinated with the team.    Total time:  More than 25 minutes spent on this visit with more than 50% time  spent on coordination of care with staff,  educating patient about treatment options, side effects and benefits and alternative treatments for medications, providing supportive therapy regarding above symptoms.    This document is created with the help of Dragon dictation system.  All grammatical/typing errors or context distortion are unintentional and inherent to software.    Cynthia Ge MD        Re-Certification I certify that the inpatient psychiatric facility services furnished since the previous certification were, and continue to be, medically necessary for, either, treatment which could reasonably be expected to improve the patient s condition or diagnostic study and that the hospital records indicate that the services furnished were, either, intensive treatment services, admission and related services necessary for diagnostic study, or equivalent services.     I certify that the patient continues to need, on a daily basis, active treatment furnished directly by or requiring the supervision of inpatient psychiatric facility personnel.   I estimate TBD days of hospitalization is necessary for proper treatment of the patient. My plans for post-hospital care for this patient are : Medications, appointments     Cynthia Ge MD

## 2022-01-21 NOTE — PLAN OF CARE
Discharge plan or goal: Home with outpatient supports    Today's Plan: CTC met with attending psychiatrist and patient for discharge. The attending psychiatrist assessed patient for risk due to comments made to peers/staff on evening shift regarding obtaining a firearm. Patient admittly denied any thoughts or intent to harm himself or anyone else. [facility] spoke with patient's father, Mark [phone], to discuss discharge and inform him of the comments made by patient yesterday. Mark reports it is not new for the patient to say statements about firearms and he and his mother contact the police if they are concerned for patient so he is able to return to the hospital. Mark denied patient has any current access to any firearms. Patient will be provided 30-day supply of medications to take with him. His parents have coordinated outpatient supports in [place], [place] and scheduled a PCP appointment for next week. Patient will be provided a taxi to the airport. His father confirmed his flight information to [place], [place]. Patient denied any other questions or concerns.     Pt has the following outpatient appointment(s) scheduled:   Patient's parents (Bg and Mark) are establishing outpatient appointments for patient with psychiatry and therapy in [place], [place] where they live. They report patient also has a PCP already appointment scheduled for 1/27/22.     Pt has the following professional community support(s): crisis resources provided     Legal Status: Voluntary    Diagnosis/Procedure:   Patient Active Problem List   Diagnosis     Suicidal ideation     Noncompliance with medication regimen     History of psychiatric disorder     Ingestion of substance, intentional self-harm, initial encounter (H)     Mood disorder (H)     Schizophrenia, schizoaffective, chronic with acute exacerbation (H)     Alcohol use disorder, severe, dependence (H)     Insomnia due to other mental disorder     ADHD (attention deficit  hyperactivity disorder), inattentive type     PTSD (post-traumatic stress disorder)       Care Rounds Attendance:   SWLUIS FELIPE   RN   Charge RN   OT/TR  MD/CNP    Linda Dickerson, [facility], 1/21/2022, 10:00 AM

## 2022-01-21 NOTE — PROGRESS NOTES
01/21/22 1200   Engagement   Intervention Group   Topic Detail SW: Process   Attendance Attended   Patient Response Demonstrated understanding of materials provided   Concentrated on Task duration of group   Cognition Follows through with task   Mood/Affect Content   Social/Behavioral Cooperative;Engaged   Thought Content Reality oriented     GROUP LENGTH (MINS):   25   RELEVANT PRIMARY DIAGNOSIS: Patient Active Problem List   Diagnosis     Suicidal ideation     Noncompliance with medication regimen     History of psychiatric disorder     Ingestion of substance, intentional self-harm, initial encounter (H)     Mood disorder (H)     Schizophrenia, schizoaffective, chronic with acute exacerbation (H)     Alcohol use disorder, severe, dependence (H)     Insomnia due to other mental disorder     ADHD (attention deficit hyperactivity disorder), inattentive type     PTSD (post-traumatic stress disorder)        TREATMENT MODALITY:      []CBT       []DBT       []ACT       []Interpersonal psychotherapy                                 []Psychoeducational therapy       [x]Skill development       []Other:        ATTENDANCE:        [x]Stayed for entire group     []Arrived late    [] Left early       # OF PATIENTS IN GROUP: 4   BILLABLE:     []Yes            [x]No   Notes:

## 2022-01-21 NOTE — DISCHARGE INSTRUCTIONS
Behavioral Discharge Planning and Instructions    Summary: You were admitted on 1/14/2022  due to Schizophrenia and Alcohol Use Disorder.  You were treated by Dr Cynthia Ge and discharged on 1/22/2022 from [facility] to Home    Main Diagnosis: Schizophrenia    Health Care Follow-up:   Follow up with outpatient psychiatrist and therapist. It is also recommended to follow up with your primary care provider within 7-10 days.     Follow up with your healthcare provider, or as advised. Contact one of the resources below for help:  If you are thinking about suicide, please call:  2-1-1, option 8, or visit www.[employer].Provenance  Crisis Text Line: Text 496359, free 24/7  National [place] on Alcoholism and Drug Dependence, www.ncadd.org, 199.711.2675  Narcotics Anonymous, www.na.org, 477.508.2632  National Alcohol and Substance Abuse Information Center, www.IndoorAtlas.WhoWanna, 920.604.5305. This  center can refer you to a treatment program.    Attend all scheduled appointments with your outpatient providers. Call at least 24 hours in advance if you need to reschedule an appointment to ensure continued access to your outpatient providers.     Major Treatments, Procedures and Findings:  You were provided with: a psychiatric assessment, medication evaluation and/or management, group therapy and milieu management    Symptoms to Report: feeling more aggressive, mood getting worse or thoughts of suicide    Early warning signs can include: increased depression or anxiety increased thoughts or behaviors of suicide or self-harm  increased unusual thinking, such as paranoia or hearing voices    Safety and Wellness:  Take all medicines as directed.  Make no changes unless your doctor suggests them.      Follow treatment recommendations.  Refrain from alcohol and non-prescribed drugs.  If there is a concern for safety, call 911.    Resources:   National [place] on Mental Illness (www.mn.noemy.org): 576.551.1749 or  765.622.3037.  Alcoholics Anonymous (www.alcoholics-anonymous.org): Check your phone book for your local chapter.  National Suicide Prevention Line (www.mentalhealthmn.org): 696-577-MRBR (6526)    General Medication Instructions:   See your medication sheet(s) for instructions.   Take all medicines as directed.  Make no changes unless your doctor suggests them.   Go to all your doctor visits.  Be sure to have all your required lab tests. This way, your medicines can be refilled on time.  Do not use any drugs not prescribed by your doctor.  Avoid alcohol.    Advance Directives:   Scanned document on file with [employer]? No scanned doc  Is document scanned? Pt states no documents  Honoring Choices Your Rights Handout: Informed and given  Was more information offered? Pt declined    The Treatment team has appreciated the opportunity to work with you. If you have any questions or concerns about your recent admission, you can contact the unit which can receive your call 24 hours a day, 7 days a week. They will be able to get in touch with a Provider if needed. The unit number is [phone].

## 2022-01-21 NOTE — PROGRESS NOTES
Pt bright and visible for meals, looking forward to discharge and fly to [place], no SI/HI, denies anxiety, depression this am, med compliant, will review discharge instructions with patient, , denied thoughts or access to firearms as expressed yesterday, no access to firearms, no pain, ate 100%, will cab to airport. Contracts for safety, dad confirmed flight to [place].

## 2022-01-21 NOTE — PLAN OF CARE
"  Problem: Behavioral Health Plan of Care  Goal: Adheres to Safety Considerations for Self and Others  Outcome: Improving  Intervention: Develop and Maintain Individualized Safety Plan  Recent Flowsheet Documentation  Taken 1/20/2022 1630 by Jessica Aaron RN  Safety Measures: safety rounds completed     Problem: Behavioral Health Plan of Care  Goal: Optimized Coping Skills in Response to Life Stressors  Outcome: Improving     Problem: Suicidal Behavior  Goal: Suicidal Behavior is Absent or Managed  Outcome: Improving   Patient is visible in the milieu watching television, engages with select peers. He is calm and cooperative with cares. Patient endorses anxiety 7/10, denies depression, SI, HI, AH and pain. Received Atarax 50 mg for anxiety at 1605 with good effect.  After dinner, patient was overheard telling another peer to arm herself with a gun for protection. The peer became agitated and starting praying. Writer approached the patient and he was educated about the importance of maintaining a healing environment. Patient was encouraged not to mention guns as it may remind other patients about their traumatic  experience. Patient agreed saying \"ok\".   At bedtime, another staff reported to this writer that the patient made a comment to her patient saying \"what if I take a machine gun and do like this\" making a shooting motion. Patient further related that he got suspended in high school for carrying a gun. Writer was not able to talk to patient as he is already in bed when staff reported this to writer.   Charge nurse  is updated about the patient's statement. Will leave a sticky note to provider.  Patient is compliant with medication. Contracts for safety. Received Melatonin at  per request for sleep.   SBP elevated in the 90's. Patient is asymptomatic. Unable to re-assess as patient went to sleep.  "
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
## 2022-01-21 NOTE — PLAN OF CARE
Problem: Behavioral Health Plan of Care  Goal: Patient-Specific Goal (Individualization)  Outcome: Adequate for Discharge  Goal: Adheres to Safety Considerations for Self and Others  Outcome: Adequate for Discharge  Goal: Absence of New-Onset Illness or Injury  Outcome: Adequate for Discharge  Goal: Optimized Coping Skills in Response to Life Stressors  Outcome: Adequate for Discharge  Goal: Develops/Participates in Therapeutic [place] to Support Successful Transition  Outcome: Adequate for Discharge     Problem: Suicidal Behavior  Goal: Suicidal Behavior is Absent or Managed  Outcome: Adequate for Discharge     Problem: Sleep Disturbance  Goal: Adequate Sleep/Rest  Outcome: Adequate for Discharge

## 2022-01-22 DIAGNOSIS — Z71.89 OTHER SPECIFIED COUNSELING: ICD-10-CM

## 2022-01-24 ENCOUNTER — PATIENT OUTREACH (OUTPATIENT)
Dept: CARE COORDINATION | Facility: CLINIC | Age: 27
End: 2022-01-24

## 2022-01-24 NOTE — PROGRESS NOTES
Clinic Care Coordination Contact  [facility]/Voicemail       Clinical Data: Care Coordinator Outreach  Outreach attempted x 1.  Left message on patient's voicemail with call back information and requested return call.  Plan: CC ELLIE will attempt to reach patient again in 1-2 business days.    CALI Coreas   Social Work Clinic Care Coordinator   [facility]  PH: [phone]  [email or web address]

## 2022-01-25 NOTE — PROGRESS NOTES
Clinic Care Coordination Contact  [facility]/Voicemail       Clinical Data: Care Coordinator Outreach  Outreach attempted x 2.  Left message on patient's voicemail with call back information and requested return call.  Plan: CC ELLIE will make no further outreaches at this time.    CALI Coreas   Social Work Clinic Care Coordinator   [facility]  PH: [phone]  [email or web address]
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
